# Patient Record
Sex: FEMALE | Race: WHITE | NOT HISPANIC OR LATINO
[De-identification: names, ages, dates, MRNs, and addresses within clinical notes are randomized per-mention and may not be internally consistent; named-entity substitution may affect disease eponyms.]

---

## 2018-06-26 ENCOUNTER — TRANSCRIPTION ENCOUNTER (OUTPATIENT)
Age: 78
End: 2018-06-26

## 2019-02-14 ENCOUNTER — RECORD ABSTRACTING (OUTPATIENT)
Age: 79
End: 2019-02-14

## 2019-02-14 DIAGNOSIS — Z82.49 FAMILY HISTORY OF ISCHEMIC HEART DISEASE AND OTHER DISEASES OF THE CIRCULATORY SYSTEM: ICD-10-CM

## 2019-02-14 DIAGNOSIS — Z78.9 OTHER SPECIFIED HEALTH STATUS: ICD-10-CM

## 2019-02-14 DIAGNOSIS — Z87.891 PERSONAL HISTORY OF NICOTINE DEPENDENCE: ICD-10-CM

## 2019-02-14 DIAGNOSIS — Z84.1 FAMILY HISTORY OF DISORDERS OF KIDNEY AND URETER: ICD-10-CM

## 2019-02-14 DIAGNOSIS — J90 PLEURAL EFFUSION, NOT ELSEWHERE CLASSIFIED: ICD-10-CM

## 2019-02-14 DIAGNOSIS — Z83.3 FAMILY HISTORY OF DIABETES MELLITUS: ICD-10-CM

## 2019-02-15 ENCOUNTER — LABORATORY RESULT (OUTPATIENT)
Age: 79
End: 2019-02-15

## 2019-02-15 ENCOUNTER — APPOINTMENT (OUTPATIENT)
Dept: INTERNAL MEDICINE | Facility: CLINIC | Age: 79
End: 2019-02-15
Payer: MEDICARE

## 2019-02-15 VITALS
HEART RATE: 75 BPM | HEIGHT: 64 IN | DIASTOLIC BLOOD PRESSURE: 78 MMHG | BODY MASS INDEX: 25.61 KG/M2 | SYSTOLIC BLOOD PRESSURE: 120 MMHG | WEIGHT: 150 LBS

## 2019-02-15 DIAGNOSIS — R42 DIZZINESS AND GIDDINESS: ICD-10-CM

## 2019-02-15 DIAGNOSIS — M47.816 SPONDYLOSIS W/OUT MYELOPATHY OR RADICULOPATHY, LUMBAR REGION: ICD-10-CM

## 2019-02-15 PROCEDURE — 36415 COLL VENOUS BLD VENIPUNCTURE: CPT

## 2019-02-15 PROCEDURE — 99397 PER PM REEVAL EST PAT 65+ YR: CPT | Mod: 25

## 2019-02-15 PROCEDURE — G0444 DEPRESSION SCREEN ANNUAL: CPT | Mod: 59

## 2019-02-15 PROCEDURE — 93000 ELECTROCARDIOGRAM COMPLETE: CPT

## 2019-02-15 RX ORDER — HYDROCODONE/ACETAMINOPHEN 10MG-500MG
10-500 TABLET ORAL
Refills: 0 | Status: ACTIVE | COMMUNITY

## 2019-02-15 RX ORDER — POTASSIUM CHLORIDE 750 MG/1
10 CAPSULE, EXTENDED RELEASE ORAL DAILY
Refills: 0 | Status: DISCONTINUED | COMMUNITY
End: 2019-02-15

## 2019-02-15 RX ORDER — ESTROGENS, CONJUGATED 0.62 MG/1
0.62 TABLET, FILM COATED ORAL
Refills: 0 | Status: ACTIVE | COMMUNITY

## 2019-02-15 RX ORDER — FUROSEMIDE 20 MG/1
20 TABLET ORAL DAILY
Refills: 0 | Status: DISCONTINUED | COMMUNITY
End: 2019-02-15

## 2019-02-15 NOTE — HISTORY OF PRESENT ILLNESS
[de-identified] : s/p adm snch pneumonia with parapneum effusion requiring thoracentesis ([facility]) 7/18 then [facility] 8/18 with ppm. Hx low back pain--spinal stenosis--goes [facility]--got epidural

## 2019-02-15 NOTE — PLAN
[FreeTextEntry1] : labs (also as requested by GI)\par ecg\par specialty f/u\par refuses vaccinations

## 2019-02-15 NOTE — HEALTH RISK ASSESSMENT
[Good] : ~his/her~  mood as  good [No falls in past year] : Patient reported no falls in the past year [0] : 2) Feeling down, depressed, or hopeless: Not at all (0) [Patient reported mammogram was normal] : Patient reported mammogram was normal [Patient reported PAP Smear was normal] : Patient reported PAP Smear was normal [Patient reported colonoscopy was normal] : Patient reported colonoscopy was normal [With Significant Other] : lives with significant other [] :  [] : No [de-identified] : victorino(cardio) [de-identified] : social [DXS2Inaul] : 0 [Change in mental status noted] : No change in mental status noted [Language] : denies difficulty with language [Behavior] : denies difficulty with behavior [Learning/Retaining New Information] : denies difficulty learning/retaining new information [Handling Complex Tasks] : denies difficulty handling complex tasks [Reasoning] : denies difficulty with reasoning [Spatial Ability and Orientation] : denies difficulty with spatial ability and orientation [Reports changes in hearing] : Reports no changes in hearing [Reports changes in vision] : Reports no changes in vision [Reports changes in dental health] : Reports no changes in dental health [Smoke Detector] : smoke detector [Carbon Monoxide Detector] : carbon monoxide detector [Guns at Home] : no guns at home [Seat Belt] :  uses seat belt [Sunscreen] : uses sunscreen [Travel to Developing Areas] : does not  travel to developing areas [TB Exposure] : is not being exposed to tuberculosis [Caregiver Concerns] : does not have caregiver concerns [MammogramDate] : 2017 [PapSmearDate] : 2017 [PapSmearComments] : belinda [ColonoscopyDate] : 2016 [ColonoscopyComments] : duarte barrera [Discussed at today's visit] : Advance Directives Discussed at today's visit

## 2019-02-18 LAB
ALBUMIN SERPL ELPH-MCNC: 4.3 G/DL
ALP BLD-CCNC: 74 U/L
ALT SERPL-CCNC: 25 U/L
ANION GAP SERPL CALC-SCNC: 15 MMOL/L
APPEARANCE: ABNORMAL
AST SERPL-CCNC: 21 U/L
BACTERIA: ABNORMAL
BASOPHILS # BLD AUTO: 0.01 K/UL
BASOPHILS NFR BLD AUTO: 0.1 %
BILIRUB SERPL-MCNC: 0.5 MG/DL
BILIRUBIN URINE: NEGATIVE
BLOOD URINE: NEGATIVE
BUN SERPL-MCNC: 17 MG/DL
CALCIUM SERPL-MCNC: 10.2 MG/DL
CHLORIDE SERPL-SCNC: 101 MMOL/L
CHOLEST SERPL-MCNC: 215 MG/DL
CHOLEST/HDLC SERPL: 2.5 RATIO
CO2 SERPL-SCNC: 26 MMOL/L
COLOR: ABNORMAL
CREAT SERPL-MCNC: 0.67 MG/DL
EOSINOPHIL # BLD AUTO: 0 K/UL
EOSINOPHIL NFR BLD AUTO: 0 %
FERRITIN SERPL-MCNC: 102 NG/ML
FOLATE SERPL-MCNC: >20 NG/ML
GGT SERPL-CCNC: 91 U/L
GLUCOSE QUALITATIVE U: NEGATIVE MG/DL
GLUCOSE SERPL-MCNC: 124 MG/DL
HBA1C MFR BLD HPLC: 5.4 %
HCT VFR BLD CALC: 40.6 %
HDLC SERPL-MCNC: 86 MG/DL
HGB BLD-MCNC: 13 G/DL
HYALINE CASTS: 0 /LPF
IMM GRANULOCYTES NFR BLD AUTO: 0.3 %
IRON SATN MFR SERPL: 46 %
IRON SERPL-MCNC: 162 UG/DL
KETONES URINE: ABNORMAL
LDLC SERPL CALC-MCNC: 107 MG/DL
LEUKOCYTE ESTERASE URINE: NEGATIVE
LYMPHOCYTES # BLD AUTO: 1.1 K/UL
LYMPHOCYTES NFR BLD AUTO: 7.7 %
MAN DIFF?: NORMAL
MCHC RBC-ENTMCNC: 31.7 PG
MCHC RBC-ENTMCNC: 32 GM/DL
MCV RBC AUTO: 99 FL
MICROSCOPIC-UA: NORMAL
MONOCYTES # BLD AUTO: 0.94 K/UL
MONOCYTES NFR BLD AUTO: 6.5 %
NEUTROPHILS # BLD AUTO: 12.28 K/UL
NEUTROPHILS NFR BLD AUTO: 85.4 %
NITRITE URINE: NEGATIVE
PH URINE: 5.5
PLATELET # BLD AUTO: 250 K/UL
POTASSIUM SERPL-SCNC: 4.4 MMOL/L
PROT SERPL-MCNC: 7.4 G/DL
PROTEIN URINE: NEGATIVE MG/DL
RBC # BLD: 4.1 M/UL
RBC # FLD: 13.6 %
RED BLOOD CELLS URINE: 1 /HPF
SODIUM SERPL-SCNC: 142 MMOL/L
SPECIFIC GRAVITY URINE: 1.03
SQUAMOUS EPITHELIAL CELLS: 16 /HPF
TIBC SERPL-MCNC: 353 UG/DL
TRIGL SERPL-MCNC: 109 MG/DL
TSH SERPL-ACNC: 0.24 UIU/ML
UIBC SERPL-MCNC: 191 UG/DL
URINE COMMENTS: NORMAL
UROBILINOGEN URINE: NEGATIVE MG/DL
VIT B12 SERPL-MCNC: 483 PG/ML
WBC # FLD AUTO: 14.37 K/UL
WHITE BLOOD CELLS URINE: 5 /HPF

## 2019-02-21 LAB
CELIAC DISEASE INTERPRETATION: NORMAL
CELIAC GENE PAIRS PRESENT: YES
DQ ALPHA 1: NORMAL
DQ BETA 1: NORMAL
IMMUNOGLOBULIN A (IGA): 320 MG/DL

## 2019-05-22 ENCOUNTER — APPOINTMENT (OUTPATIENT)
Dept: ORTHOPEDIC SURGERY | Facility: CLINIC | Age: 79
End: 2019-05-22
Payer: MEDICARE

## 2019-05-22 VITALS
WEIGHT: 150 LBS | SYSTOLIC BLOOD PRESSURE: 130 MMHG | DIASTOLIC BLOOD PRESSURE: 79 MMHG | HEIGHT: 64 IN | BODY MASS INDEX: 25.61 KG/M2 | HEART RATE: 81 BPM

## 2019-05-22 PROCEDURE — 72100 X-RAY EXAM L-S SPINE 2/3 VWS: CPT

## 2019-05-22 PROCEDURE — 99204 OFFICE O/P NEW MOD 45 MIN: CPT

## 2019-05-22 NOTE — CONSULT LETTER
[Dear  ___] : Dear  [unfilled], [Consult Letter:] : I had the pleasure of evaluating your patient, [unfilled]. [Sincerely,] : Sincerely, [FreeTextEntry2] : Dr. Augustine Goel \par [phone] [FreeTextEntry3] : Ham Boateng MD

## 2019-05-22 NOTE — DISCUSSION/SUMMARY
[Surgical risks reviewed] : Surgical risks reviewed [de-identified] : Lumbar spondylolisthesis.\par Severe spinal stenosis.\par We discussed options.\par Several conservative treatment including injections, medications and therapy.\par She like to undergo surgery.\par This would involve an L4-5 decompression and fusion with instrumentation.\par Surgery will entail a lumbar decompressive procedure and fusion with possible instrumentation, local autograft bone, and demineralized bone matrix. Spinal cord monitoring will be used.\par Risks of surgery include infection, dural tear, nerve root injury, reherniation, future back pain, future leg pain, retained fragment, hematoma, urinary retention, worsening leg symptoms, footdrop, inability to place hardware, hardware breakage, nonunion, adjacent level breakdown requiring surgery, anesthetic risks, blood transfusion risks, positioning pain, visceral and vascular injury, deep vein thrombosis, pulmonary embolus, and death. Somatosensory evoked potentials and EMG monitoring will be used. Patient will need spinal orthosis pre and post operatively. All risks were explained not exclusive to the ones mentioned alternatives were discussed and all questions were answered the patient agrees and understands the above and is in complete agreement with the plan. \par Wants a date for surgery \par Discussed options for surgery \par All options discussed including rest, medicine, home exercise, acupuncture, Chiropractic care, Physical Therapy, Pain management, and last resort surgery. \par Her  agrees with the plan.\par All questions were answered, all alternatives discussed and the patient is in complete agreement with that plan. Follow-up appointment as instructed. Any issues and the patient will call or come in sooner.

## 2019-05-22 NOTE — PHYSICAL EXAM
[UE/LE] : Sensory: Intact in bilateral upper & lower extremities [ALL] : dorsalis pedis, posterior tibial, femoral, popliteal, and radial 2+ and symmetric bilaterally [de-identified] : A 4/5 strength tibialis anterior with decreased sensation in L5 distribution otherwise, 5 out of 5 motor strength, sensation is intact and symmetrical full range of motion flexion extension and rotation, no palpatory tenderness full range of motion of hips knees shoulders and elbows (all four extremities), no atrophy, negative straight leg raise, no pathological reflexes, no swelling, normal ambulation, no apparent distress skin is intact, no palpable lymph nodes, no upper or lower extremity instability, alert and oriented x3 and normal mood. Normal finger-to nose test.  [de-identified] : AP/lat lumbar: L4-5 spondylolisthesis-Reviewed with the patient and .\par 1/7/19 [facility] MRI lumbar: Severe L4-5 and mild L3-4 central spinal stenosis, Grade 1 anterolisthesis L4- L5

## 2019-05-22 NOTE — HISTORY OF PRESENT ILLNESS
[Worsening] : worsening [de-identified] : Pt c/o low back pain for several years progressively worsening over the pat 1.5 yrs\par Pt denies radiculopathy at this time\par Had LESI since 2003, had 2 this year, this provides relief in pain for 1-2 months, last injection 2 months ago, performed by Dr Reji Sunshine\par Pt unable to stand for long amount of time due to pain\par Pt takes Hydrocodone 10/325 BID\par Pt stopped PT due to exacerbation of her symptoms\par Patient is miserable and worsening with decreased ambulation. She suffers from stenosis with neurogenic claudication and the injections helped temporarily. If her pain returns. She is here with her .\par No fever chills sweats nausea vomiting no bowel or bladder dysfunction, no recent weight loss or gain no night pain. This history is in addition to the intake form that I personally reviewed.

## 2019-07-02 ENCOUNTER — APPOINTMENT (OUTPATIENT)
Dept: INTERNAL MEDICINE | Facility: CLINIC | Age: 79
End: 2019-07-02
Payer: MEDICARE

## 2019-07-02 VITALS
HEIGHT: 62.5 IN | SYSTOLIC BLOOD PRESSURE: 142 MMHG | WEIGHT: 155 LBS | BODY MASS INDEX: 27.81 KG/M2 | RESPIRATION RATE: 16 BRPM | DIASTOLIC BLOOD PRESSURE: 76 MMHG

## 2019-07-02 DIAGNOSIS — R60.0 LOCALIZED EDEMA: ICD-10-CM

## 2019-07-02 DIAGNOSIS — I87.2 VENOUS INSUFFICIENCY (CHRONIC) (PERIPHERAL): ICD-10-CM

## 2019-07-02 PROCEDURE — 99213 OFFICE O/P EST LOW 20 MIN: CPT

## 2019-07-02 NOTE — HISTORY OF PRESENT ILLNESS
[FreeTextEntry1] : edema [de-identified] : notes semi chronic leg edema, worse past wk--for spinal stenosis surgery 7/30--[facility]--to get echo and cardiac w/u preop--had ppm 8/18[facility]

## 2019-07-02 NOTE — PHYSICAL EXAM
[Normal] : soft, non-tender, non-distended, no masses palpated, no HSM and normal bowel sounds [de-identified] : 1+ edema ol lower legs--neg homans

## 2019-07-15 ENCOUNTER — OUTPATIENT (OUTPATIENT)
Dept: OUTPATIENT SERVICES | Facility: HOSPITAL | Age: 79
LOS: 1 days | End: 2019-07-15

## 2019-07-15 VITALS
SYSTOLIC BLOOD PRESSURE: 136 MMHG | OXYGEN SATURATION: 99 % | WEIGHT: 158.07 LBS | HEART RATE: 76 BPM | RESPIRATION RATE: 16 BRPM | TEMPERATURE: 98 F | DIASTOLIC BLOOD PRESSURE: 70 MMHG | HEIGHT: 62 IN

## 2019-07-15 DIAGNOSIS — Z95.0 PRESENCE OF CARDIAC PACEMAKER: Chronic | ICD-10-CM

## 2019-07-15 DIAGNOSIS — M48.07 SPINAL STENOSIS, LUMBOSACRAL REGION: ICD-10-CM

## 2019-07-15 DIAGNOSIS — Z98.890 OTHER SPECIFIED POSTPROCEDURAL STATES: Chronic | ICD-10-CM

## 2019-07-15 LAB
ANION GAP SERPL CALC-SCNC: 15 MMO/L — HIGH (ref 7–14)
BLD GP AB SCN SERPL QL: NEGATIVE — SIGNIFICANT CHANGE UP
BUN SERPL-MCNC: 10 MG/DL — SIGNIFICANT CHANGE UP (ref 7–23)
CALCIUM SERPL-MCNC: 9.8 MG/DL — SIGNIFICANT CHANGE UP (ref 8.4–10.5)
CHLORIDE SERPL-SCNC: 98 MMOL/L — SIGNIFICANT CHANGE UP (ref 98–107)
CO2 SERPL-SCNC: 25 MMOL/L — SIGNIFICANT CHANGE UP (ref 22–31)
CREAT SERPL-MCNC: 0.53 MG/DL — SIGNIFICANT CHANGE UP (ref 0.5–1.3)
GLUCOSE SERPL-MCNC: 75 MG/DL — SIGNIFICANT CHANGE UP (ref 70–99)
HCT VFR BLD CALC: 42.2 % — SIGNIFICANT CHANGE UP (ref 34.5–45)
HGB BLD-MCNC: 13.6 G/DL — SIGNIFICANT CHANGE UP (ref 11.5–15.5)
MCHC RBC-ENTMCNC: 32 PG — SIGNIFICANT CHANGE UP (ref 27–34)
MCHC RBC-ENTMCNC: 32.2 % — SIGNIFICANT CHANGE UP (ref 32–36)
MCV RBC AUTO: 99.3 FL — SIGNIFICANT CHANGE UP (ref 80–100)
NRBC # FLD: 0.02 K/UL — SIGNIFICANT CHANGE UP (ref 0–0)
PLATELET # BLD AUTO: 228 K/UL — SIGNIFICANT CHANGE UP (ref 150–400)
PMV BLD: 10.1 FL — SIGNIFICANT CHANGE UP (ref 7–13)
POTASSIUM SERPL-MCNC: 4.4 MMOL/L — SIGNIFICANT CHANGE UP (ref 3.5–5.3)
POTASSIUM SERPL-SCNC: 4.4 MMOL/L — SIGNIFICANT CHANGE UP (ref 3.5–5.3)
RBC # BLD: 4.25 M/UL — SIGNIFICANT CHANGE UP (ref 3.8–5.2)
RBC # FLD: 12.1 % — SIGNIFICANT CHANGE UP (ref 10.3–14.5)
RH IG SCN BLD-IMP: POSITIVE — SIGNIFICANT CHANGE UP
SODIUM SERPL-SCNC: 138 MMOL/L — SIGNIFICANT CHANGE UP (ref 135–145)
WBC # BLD: 8.48 K/UL — SIGNIFICANT CHANGE UP (ref 3.8–10.5)
WBC # FLD AUTO: 8.48 K/UL — SIGNIFICANT CHANGE UP (ref 3.8–10.5)

## 2019-07-15 RX ORDER — SODIUM CHLORIDE 9 MG/ML
1000 INJECTION, SOLUTION INTRAVENOUS
Refills: 0 | Status: DISCONTINUED | OUTPATIENT
Start: 2019-07-30 | End: 2019-07-31

## 2019-07-15 RX ORDER — SODIUM CHLORIDE 9 MG/ML
3 INJECTION INTRAMUSCULAR; INTRAVENOUS; SUBCUTANEOUS EVERY 8 HOURS
Refills: 0 | Status: DISCONTINUED | OUTPATIENT
Start: 2019-07-30 | End: 2019-08-01

## 2019-07-15 NOTE — H&P PST ADULT - OTHER CARE PROVIDERS
Dr. Tamir Valdivia Cardiologist [phone]  Dr. Mazariegos Heart Surgeon [phone] (pacemaker) Dr. Tamir Valdivia Cardiologist [phone]  Dr. Mazariegos Heart Surgeon [phone] (pacemaker)  Dr Dawson Sunshine [phone]

## 2019-07-15 NOTE — H&P PST ADULT - NEUROLOGICAL SYMPTOMS
vertigo/loss of sensation/difficulty walking/paresthesias/neuropathy from knees down bilaterally, difficulty walking due to spinal stenosis and peripherl neurapathy

## 2019-07-15 NOTE — H&P PST ADULT - NSICDXPROBLEM_GEN_ALL_CORE_FT
PROBLEM DIAGNOSES  Problem: Spondylolisthesis, lumbar region  Assessment and Plan: Patient scheduled for surgery on 7/30.19  Patient provided with verbal and written pre-surgical instructions, patient verbalized understanding with teach back   Patient provided with Pepcid for GI prophylaxis, Pt verbalized understanding   Patient provided with verbal and written instructions on Chlorhexidine wash, Patient verbalized understanding with teach back   OR booking notified of Pacemaker.   Stress test and echo requested   Patient is going for routine Pacemaker evaluation next week,, report requested   Patient is going for medical clearance as per surgeon's request.

## 2019-07-15 NOTE — H&P PST ADULT - NSICDXPASTSURGICALHX_GEN_ALL_CORE_FT
PAST SURGICAL HISTORY:  Cardiac pacemaker 2018    History of hysteroscopy 1988 due to fibroids PAST SURGICAL HISTORY:  Cardiac pacemaker 2018    H/O: hysterectomy Due to fibroids

## 2019-07-15 NOTE — H&P PST ADULT - HISTORY OF PRESENT ILLNESS
Patient presents today with past medical history of .... Presents today for presurgical evaluation for ... on 7/30/19 Patient presents today with past medical history of spinal stenosis and chronic lower back pain  Presents today for presurgical evaluation for L4-L5 Lumbar laminectomy fusion with instrumentation  on 7/30/19

## 2019-07-15 NOTE — H&P PST ADULT - NSANTHOSAYNRD_GEN_A_CORE
No. CHAD screening performed.  STOP BANG Legend: 0-2 = LOW Risk; 3-4 = INTERMEDIATE Risk; 5-8 = HIGH Risk

## 2019-07-15 NOTE — H&P PST ADULT - ASSESSMENT
79 year old female with past medical history of spinal stenosis and chronic debilitating lower back pain.

## 2019-07-16 PROBLEM — Z86.79 PERSONAL HISTORY OF OTHER DISEASES OF THE CIRCULATORY SYSTEM: Chronic | Status: ACTIVE | Noted: 2019-07-15

## 2019-07-16 LAB — SPECIMEN SOURCE: SIGNIFICANT CHANGE UP

## 2019-07-18 ENCOUNTER — APPOINTMENT (OUTPATIENT)
Dept: INTERNAL MEDICINE | Facility: CLINIC | Age: 79
End: 2019-07-18
Payer: MEDICARE

## 2019-07-18 VITALS
SYSTOLIC BLOOD PRESSURE: 130 MMHG | HEIGHT: 62.5 IN | RESPIRATION RATE: 16 BRPM | WEIGHT: 156 LBS | DIASTOLIC BLOOD PRESSURE: 80 MMHG | BODY MASS INDEX: 27.99 KG/M2

## 2019-07-18 LAB — BACTERIA NPH CULT: SIGNIFICANT CHANGE UP

## 2019-07-18 PROCEDURE — 99214 OFFICE O/P EST MOD 30 MIN: CPT

## 2019-07-18 NOTE — PHYSICAL EXAM
[Normal] : soft, non-tender, non-distended, no masses palpated, no HSM and normal bowel sounds [de-identified] : chronic venous changes

## 2019-07-18 NOTE — PLAN
[FreeTextEntry1] : NO MEDICAL CONTRAINDICATIONS TO SURGERY\par CARDIAC CLEARANCE UNDER SEPARATE COVER

## 2019-07-18 NOTE — HISTORY OF PRESENT ILLNESS
[Implantable Device/Pacemaker] : implantable device/pacemaker [No Pertinent Pulmonary History] : no history of asthma, COPD, sleep apnea, or smoking [No Adverse Anesthesia Reaction] : no adverse anesthesia reaction in self or family member [(Patient denies any chest pain, claudication, dyspnea on exertion, orthopnea, palpitations or syncope)] : Patient denies any chest pain, claudication, dyspnea on exertion, orthopnea, palpitations or syncope [Moderate (4-6 METs)] : Moderate (4-6 METs) [Aortic Stenosis] : no aortic stenosis [Atrial Fibrillation] : no atrial fibrillation [Coronary Artery Disease] : no coronary artery disease [Recent Myocardial Infarction] : no recent myocardial infarction [Chronic Anticoagulation] : no chronic anticoagulation [Chronic Kidney Disease] : no chronic kidney disease [Diabetes] : no diabetes [FreeTextEntry1] : Spinal surgery [FreeTextEntry2] : 7/30/19 [FreeTextEntry3] : [facility] [FreeTextEntry4] : recent venous doppler of legs negative [FreeTextEntry7] : Had recent Echo preop and prior ETT neg (cardio is Livan Valdivia)

## 2019-07-29 ENCOUNTER — TRANSCRIPTION ENCOUNTER (OUTPATIENT)
Age: 79
End: 2019-07-29

## 2019-07-30 ENCOUNTER — APPOINTMENT (OUTPATIENT)
Dept: ORTHOPEDIC SURGERY | Facility: HOSPITAL | Age: 79
End: 2019-07-30
Payer: MEDICARE

## 2019-07-30 ENCOUNTER — RESULT REVIEW (OUTPATIENT)
Age: 79
End: 2019-07-30

## 2019-07-30 ENCOUNTER — INPATIENT (INPATIENT)
Facility: HOSPITAL | Age: 79
LOS: 1 days | Discharge: HOME | End: 2019-08-01
Attending: ORTHOPAEDIC SURGERY | Admitting: ORTHOPAEDIC SURGERY
Payer: MEDICARE

## 2019-07-30 VITALS
WEIGHT: 158.07 LBS | SYSTOLIC BLOOD PRESSURE: 153 MMHG | DIASTOLIC BLOOD PRESSURE: 62 MMHG | RESPIRATION RATE: 14 BRPM | HEART RATE: 70 BPM | OXYGEN SATURATION: 98 % | TEMPERATURE: 99 F | HEIGHT: 62 IN

## 2019-07-30 DIAGNOSIS — Z95.0 PRESENCE OF CARDIAC PACEMAKER: Chronic | ICD-10-CM

## 2019-07-30 DIAGNOSIS — Z90.710 ACQUIRED ABSENCE OF BOTH CERVIX AND UTERUS: Chronic | ICD-10-CM

## 2019-07-30 DIAGNOSIS — M48.07 SPINAL STENOSIS, LUMBOSACRAL REGION: ICD-10-CM

## 2019-07-30 DIAGNOSIS — M43.16 SPONDYLOLISTHESIS, LUMBAR REGION: ICD-10-CM

## 2019-07-30 LAB
ANION GAP SERPL CALC-SCNC: 13 MMO/L — SIGNIFICANT CHANGE UP (ref 7–14)
BASOPHILS # BLD AUTO: 0.03 K/UL — SIGNIFICANT CHANGE UP (ref 0–0.2)
BASOPHILS NFR BLD AUTO: 0.2 % — SIGNIFICANT CHANGE UP (ref 0–2)
BUN SERPL-MCNC: 10 MG/DL — SIGNIFICANT CHANGE UP (ref 7–23)
CALCIUM SERPL-MCNC: 8.9 MG/DL — SIGNIFICANT CHANGE UP (ref 8.4–10.5)
CHLORIDE SERPL-SCNC: 103 MMOL/L — SIGNIFICANT CHANGE UP (ref 98–107)
CO2 SERPL-SCNC: 24 MMOL/L — SIGNIFICANT CHANGE UP (ref 22–31)
CREAT SERPL-MCNC: 0.55 MG/DL — SIGNIFICANT CHANGE UP (ref 0.5–1.3)
EOSINOPHIL # BLD AUTO: 0.08 K/UL — SIGNIFICANT CHANGE UP (ref 0–0.5)
EOSINOPHIL NFR BLD AUTO: 0.6 % — SIGNIFICANT CHANGE UP (ref 0–6)
GLUCOSE SERPL-MCNC: 185 MG/DL — HIGH (ref 70–99)
HCT VFR BLD CALC: 35.5 % — SIGNIFICANT CHANGE UP (ref 34.5–45)
HGB BLD-MCNC: 11.8 G/DL — SIGNIFICANT CHANGE UP (ref 11.5–15.5)
IMM GRANULOCYTES NFR BLD AUTO: 0.8 % — SIGNIFICANT CHANGE UP (ref 0–1.5)
LYMPHOCYTES # BLD AUTO: 0.58 K/UL — LOW (ref 1–3.3)
LYMPHOCYTES # BLD AUTO: 4.3 % — LOW (ref 13–44)
MCHC RBC-ENTMCNC: 32.6 PG — SIGNIFICANT CHANGE UP (ref 27–34)
MCHC RBC-ENTMCNC: 33.2 % — SIGNIFICANT CHANGE UP (ref 32–36)
MCV RBC AUTO: 98.1 FL — SIGNIFICANT CHANGE UP (ref 80–100)
MONOCYTES # BLD AUTO: 0.16 K/UL — SIGNIFICANT CHANGE UP (ref 0–0.9)
MONOCYTES NFR BLD AUTO: 1.2 % — LOW (ref 2–14)
NEUTROPHILS # BLD AUTO: 12.68 K/UL — HIGH (ref 1.8–7.4)
NEUTROPHILS NFR BLD AUTO: 92.9 % — HIGH (ref 43–77)
NRBC # FLD: 0 K/UL — SIGNIFICANT CHANGE UP (ref 0–0)
PLATELET # BLD AUTO: 173 K/UL — SIGNIFICANT CHANGE UP (ref 150–400)
PMV BLD: 9.8 FL — SIGNIFICANT CHANGE UP (ref 7–13)
POTASSIUM SERPL-MCNC: 3.8 MMOL/L — SIGNIFICANT CHANGE UP (ref 3.5–5.3)
POTASSIUM SERPL-SCNC: 3.8 MMOL/L — SIGNIFICANT CHANGE UP (ref 3.5–5.3)
RBC # BLD: 3.62 M/UL — LOW (ref 3.8–5.2)
RBC # FLD: 12.1 % — SIGNIFICANT CHANGE UP (ref 10.3–14.5)
RH IG SCN BLD-IMP: POSITIVE — SIGNIFICANT CHANGE UP
SODIUM SERPL-SCNC: 140 MMOL/L — SIGNIFICANT CHANGE UP (ref 135–145)
WBC # BLD: 13.64 K/UL — HIGH (ref 3.8–10.5)
WBC # FLD AUTO: 13.64 K/UL — HIGH (ref 3.8–10.5)

## 2019-07-30 PROCEDURE — 22840 INSERT SPINE FIXATION DEVICE: CPT | Mod: 82

## 2019-07-30 PROCEDURE — 63267 EXCISE INTRSPINL LESION LMBR: CPT | Mod: 82

## 2019-07-30 PROCEDURE — 22840 INSERT SPINE FIXATION DEVICE: CPT

## 2019-07-30 PROCEDURE — 63048 LAM FACETEC &FORAMOT EA ADDL: CPT | Mod: 82

## 2019-07-30 PROCEDURE — 63047 LAM FACETEC & FORAMOT LUMBAR: CPT | Mod: 82,59

## 2019-07-30 PROCEDURE — 63047 LAM FACETEC & FORAMOT LUMBAR: CPT | Mod: 59

## 2019-07-30 PROCEDURE — 88304 TISSUE EXAM BY PATHOLOGIST: CPT | Mod: 26

## 2019-07-30 PROCEDURE — 22612 ARTHRD PST TQ 1NTRSPC LUMBAR: CPT | Mod: 82

## 2019-07-30 PROCEDURE — 63048 LAM FACETEC &FORAMOT EA ADDL: CPT

## 2019-07-30 PROCEDURE — 63267 EXCISE INTRSPINL LESION LMBR: CPT

## 2019-07-30 PROCEDURE — 72100 X-RAY EXAM L-S SPINE 2/3 VWS: CPT | Mod: 26

## 2019-07-30 PROCEDURE — 22612 ARTHRD PST TQ 1NTRSPC LUMBAR: CPT

## 2019-07-30 PROCEDURE — 88311 DECALCIFY TISSUE: CPT | Mod: 26

## 2019-07-30 RX ORDER — MAGNESIUM HYDROXIDE 400 MG/1
30 TABLET, CHEWABLE ORAL EVERY 12 HOURS
Refills: 0 | Status: DISCONTINUED | OUTPATIENT
Start: 2019-07-30 | End: 2019-08-01

## 2019-07-30 RX ORDER — OXYCODONE HYDROCHLORIDE 5 MG/1
10 TABLET ORAL EVERY 4 HOURS
Refills: 0 | Status: DISCONTINUED | OUTPATIENT
Start: 2019-07-30 | End: 2019-08-01

## 2019-07-30 RX ORDER — SODIUM CHLORIDE 9 MG/ML
1000 INJECTION INTRAMUSCULAR; INTRAVENOUS; SUBCUTANEOUS
Refills: 0 | Status: DISCONTINUED | OUTPATIENT
Start: 2019-07-30 | End: 2019-08-01

## 2019-07-30 RX ORDER — ESTROGENS, CONJUGATED 0.625 MG
1 TABLET ORAL
Qty: 0 | Refills: 0 | DISCHARGE

## 2019-07-30 RX ORDER — ACETAMINOPHEN 500 MG
975 TABLET ORAL EVERY 8 HOURS
Refills: 0 | Status: DISCONTINUED | OUTPATIENT
Start: 2019-07-30 | End: 2019-08-01

## 2019-07-30 RX ORDER — CEFAZOLIN SODIUM 1 G
2000 VIAL (EA) INJECTION EVERY 8 HOURS
Refills: 0 | Status: COMPLETED | OUTPATIENT
Start: 2019-07-30 | End: 2019-07-31

## 2019-07-30 RX ORDER — HYDROMORPHONE HYDROCHLORIDE 2 MG/ML
0.5 INJECTION INTRAMUSCULAR; INTRAVENOUS; SUBCUTANEOUS EVERY 4 HOURS
Refills: 0 | Status: DISCONTINUED | OUTPATIENT
Start: 2019-07-30 | End: 2019-08-01

## 2019-07-30 RX ORDER — HYDROMORPHONE HYDROCHLORIDE 2 MG/ML
1 INJECTION INTRAMUSCULAR; INTRAVENOUS; SUBCUTANEOUS
Refills: 0 | Status: DISCONTINUED | OUTPATIENT
Start: 2019-07-30 | End: 2019-07-30

## 2019-07-30 RX ORDER — SENNA PLUS 8.6 MG/1
2 TABLET ORAL AT BEDTIME
Refills: 0 | Status: DISCONTINUED | OUTPATIENT
Start: 2019-07-30 | End: 2019-08-01

## 2019-07-30 RX ORDER — ASCORBIC ACID 60 MG
1 TABLET,CHEWABLE ORAL
Qty: 0 | Refills: 0 | DISCHARGE

## 2019-07-30 RX ORDER — OXYCODONE HYDROCHLORIDE 5 MG/1
5 TABLET ORAL EVERY 4 HOURS
Refills: 0 | Status: DISCONTINUED | OUTPATIENT
Start: 2019-07-30 | End: 2019-08-01

## 2019-07-30 RX ORDER — HYDROMORPHONE HYDROCHLORIDE 2 MG/ML
0.5 INJECTION INTRAMUSCULAR; INTRAVENOUS; SUBCUTANEOUS
Refills: 0 | Status: DISCONTINUED | OUTPATIENT
Start: 2019-07-30 | End: 2019-07-30

## 2019-07-30 RX ORDER — ONDANSETRON 8 MG/1
4 TABLET, FILM COATED ORAL EVERY 4 HOURS
Refills: 0 | Status: DISCONTINUED | OUTPATIENT
Start: 2019-07-30 | End: 2019-08-01

## 2019-07-30 RX ORDER — MECLIZINE HCL 12.5 MG
25 TABLET ORAL THREE TIMES A DAY
Refills: 0 | Status: DISCONTINUED | OUTPATIENT
Start: 2019-07-30 | End: 2019-08-01

## 2019-07-30 RX ORDER — CYCLOBENZAPRINE HYDROCHLORIDE 10 MG/1
5 TABLET, FILM COATED ORAL THREE TIMES A DAY
Refills: 0 | Status: DISCONTINUED | OUTPATIENT
Start: 2019-07-30 | End: 2019-08-01

## 2019-07-30 RX ORDER — ONDANSETRON 8 MG/1
4 TABLET, FILM COATED ORAL ONCE
Refills: 0 | Status: DISCONTINUED | OUTPATIENT
Start: 2019-07-30 | End: 2019-07-30

## 2019-07-30 RX ORDER — DOCUSATE SODIUM 100 MG
100 CAPSULE ORAL THREE TIMES A DAY
Refills: 0 | Status: DISCONTINUED | OUTPATIENT
Start: 2019-07-30 | End: 2019-08-01

## 2019-07-30 RX ADMIN — OXYCODONE HYDROCHLORIDE 10 MILLIGRAM(S): 5 TABLET ORAL at 22:50

## 2019-07-30 RX ADMIN — HYDROMORPHONE HYDROCHLORIDE 0.5 MILLIGRAM(S): 2 INJECTION INTRAMUSCULAR; INTRAVENOUS; SUBCUTANEOUS at 17:00

## 2019-07-30 RX ADMIN — HYDROMORPHONE HYDROCHLORIDE 1 MILLIGRAM(S): 2 INJECTION INTRAMUSCULAR; INTRAVENOUS; SUBCUTANEOUS at 16:35

## 2019-07-30 RX ADMIN — Medication 100 MILLIGRAM(S): at 21:29

## 2019-07-30 RX ADMIN — OXYCODONE HYDROCHLORIDE 10 MILLIGRAM(S): 5 TABLET ORAL at 19:20

## 2019-07-30 RX ADMIN — HYDROMORPHONE HYDROCHLORIDE 1 MILLIGRAM(S): 2 INJECTION INTRAMUSCULAR; INTRAVENOUS; SUBCUTANEOUS at 16:50

## 2019-07-30 RX ADMIN — SENNA PLUS 2 TABLET(S): 8.6 TABLET ORAL at 21:29

## 2019-07-30 RX ADMIN — Medication 975 MILLIGRAM(S): at 21:28

## 2019-07-30 RX ADMIN — SODIUM CHLORIDE 30 MILLILITER(S): 9 INJECTION, SOLUTION INTRAVENOUS at 16:25

## 2019-07-30 RX ADMIN — HYDROMORPHONE HYDROCHLORIDE 0.5 MILLIGRAM(S): 2 INJECTION INTRAMUSCULAR; INTRAVENOUS; SUBCUTANEOUS at 17:55

## 2019-07-30 RX ADMIN — HYDROMORPHONE HYDROCHLORIDE 0.5 MILLIGRAM(S): 2 INJECTION INTRAMUSCULAR; INTRAVENOUS; SUBCUTANEOUS at 17:15

## 2019-07-30 RX ADMIN — SODIUM CHLORIDE 125 MILLILITER(S): 9 INJECTION INTRAMUSCULAR; INTRAVENOUS; SUBCUTANEOUS at 16:35

## 2019-07-30 RX ADMIN — HYDROMORPHONE HYDROCHLORIDE 0.5 MILLIGRAM(S): 2 INJECTION INTRAMUSCULAR; INTRAVENOUS; SUBCUTANEOUS at 17:40

## 2019-07-30 RX ADMIN — OXYCODONE HYDROCHLORIDE 10 MILLIGRAM(S): 5 TABLET ORAL at 18:50

## 2019-07-30 RX ADMIN — SODIUM CHLORIDE 3 MILLILITER(S): 9 INJECTION INTRAMUSCULAR; INTRAVENOUS; SUBCUTANEOUS at 22:09

## 2019-07-30 RX ADMIN — OXYCODONE HYDROCHLORIDE 10 MILLIGRAM(S): 5 TABLET ORAL at 23:30

## 2019-07-30 NOTE — PROGRESS NOTE ADULT - SUBJECTIVE AND OBJECTIVE BOX
ORTHO POST OP NOTE    Pt resting comfortably without complaint. No chest pain/no dizziness/no SOB/HA/N/V/D.  Pain controlled at this time. Patient reports incisional pain in the lumbar spine.       Vital Signs Last 24 Hrs  T(C): 36.5 (30 Jul 2019 18:00), Max: 37 (30 Jul 2019 09:28)  T(F): 97.7 (30 Jul 2019 18:00), Max: 98.6 (30 Jul 2019 09:28)  HR: 75 (30 Jul 2019 19:00) (64 - 87)  BP: 121/47 (30 Jul 2019 18:45) (117/44 - 153/62)  BP(mean): 64 (30 Jul 2019 18:45) (61 - 79)  RR: 15 (30 Jul 2019 19:00) (12 - 22)  SpO2: 99% (30 Jul 2019 19:00) (92% - 99%)                        11.8   13.64 )-----------( 173      ( 30 Jul 2019 18:00 )             35.5     07-30    140  |  103  |  10  ----------------------------<  185<H>  3.8   |  24  |  0.55    Ca    8.9      30 Jul 2019 18:00        Spine: Dressing/ Incision Clean/Dry/Intact  HV in place  Bilat LE exam:  EHL/FHL/GC/TA 5/5                     Sensation grossly intact to light touch distal                     DP pulse 2+      A/P: 79y Female s/p L4-5 lumbar Laminectomy/PSF, POD #0  - WBAT  -DVT ppx- venodynes + ambulation  - Pain control  -Incentive spirometer  -FU AM labs  -PT/OT  -Continue to monitor. Notify ortho with any questions. ORTHO POST OP NOTE    Pt resting comfortably without complaint. No chest pain/no dizziness/no SOB/HA/N/V/D.  Pain controlled at this time. Patient reports incisional pain in the lumbar spine.       Vital Signs Last 24 Hrs  T(C): 36.5 (30 Jul 2019 18:00), Max: 37 (30 Jul 2019 09:28)  T(F): 97.7 (30 Jul 2019 18:00), Max: 98.6 (30 Jul 2019 09:28)  HR: 75 (30 Jul 2019 19:00) (64 - 87)  BP: 121/47 (30 Jul 2019 18:45) (117/44 - 153/62)  BP(mean): 64 (30 Jul 2019 18:45) (61 - 79)  RR: 15 (30 Jul 2019 19:00) (12 - 22)  SpO2: 99% (30 Jul 2019 19:00) (92% - 99%)                        11.8   13.64 )-----------( 173      ( 30 Jul 2019 18:00 )             35.5     07-30    140  |  103  |  10  ----------------------------<  185<H>  3.8   |  24  |  0.55    Ca    8.9      30 Jul 2019 18:00        Spine: Dressing/ Incision Clean/Dry/Intact  HV in place  Bilat LE exam:  EHL/FHL/GC/TA 5/5                     Sensation grossly intact to light touch distal                     DP pulse 2+      A/P: 79y Female s/p L4-5 lumbar Laminectomy/PSF, POD #0  - WBAT  -DVT ppx- venodynes + ambulation  - Pain control  -Incentive spirometer  -FU AM labs  -PT/OT  -Continue to monitor. Notify ortho with any questions.    Patient seen post-operatively doing well. Resolved pre-operative leg symptoms, incisional pain controlled. I discussed and agree with the above, Dr. Ham Boateng.

## 2019-07-30 NOTE — BRIEF OPERATIVE NOTE - NSICDXBRIEFPROCEDURE_GEN_ALL_CORE_FT
PROCEDURES:  Fusion of posterior lumbar spine 30-Jul-2019 16:10:28 L4/5 [facility] [place]  Laminectomy for posterior lumbar decompression 30-Jul-2019 16:10:15 L4/5 [facility] [place]

## 2019-07-31 ENCOUNTER — TRANSCRIPTION ENCOUNTER (OUTPATIENT)
Age: 79
End: 2019-07-31

## 2019-07-31 DIAGNOSIS — D50.0 IRON DEFICIENCY ANEMIA SECONDARY TO BLOOD LOSS (CHRONIC): ICD-10-CM

## 2019-07-31 DIAGNOSIS — M48.00 SPINAL STENOSIS, SITE UNSPECIFIED: ICD-10-CM

## 2019-07-31 DIAGNOSIS — D72.829 ELEVATED WHITE BLOOD CELL COUNT, UNSPECIFIED: ICD-10-CM

## 2019-07-31 LAB
ANION GAP SERPL CALC-SCNC: 11 MMO/L — SIGNIFICANT CHANGE UP (ref 7–14)
BASOPHILS # BLD AUTO: 0.02 K/UL — SIGNIFICANT CHANGE UP (ref 0–0.2)
BASOPHILS NFR BLD AUTO: 0.1 % — SIGNIFICANT CHANGE UP (ref 0–2)
BUN SERPL-MCNC: 8 MG/DL — SIGNIFICANT CHANGE UP (ref 7–23)
CALCIUM SERPL-MCNC: 8.1 MG/DL — LOW (ref 8.4–10.5)
CHLORIDE SERPL-SCNC: 106 MMOL/L — SIGNIFICANT CHANGE UP (ref 98–107)
CO2 SERPL-SCNC: 24 MMOL/L — SIGNIFICANT CHANGE UP (ref 22–31)
CREAT SERPL-MCNC: 0.57 MG/DL — SIGNIFICANT CHANGE UP (ref 0.5–1.3)
EOSINOPHIL # BLD AUTO: 0.01 K/UL — SIGNIFICANT CHANGE UP (ref 0–0.5)
EOSINOPHIL NFR BLD AUTO: 0.1 % — SIGNIFICANT CHANGE UP (ref 0–6)
GLUCOSE SERPL-MCNC: 156 MG/DL — HIGH (ref 70–99)
HCT VFR BLD CALC: 32.5 % — LOW (ref 34.5–45)
HGB BLD-MCNC: 10.5 G/DL — LOW (ref 11.5–15.5)
IMM GRANULOCYTES NFR BLD AUTO: 0.6 % — SIGNIFICANT CHANGE UP (ref 0–1.5)
LYMPHOCYTES # BLD AUTO: 0.56 K/UL — LOW (ref 1–3.3)
LYMPHOCYTES # BLD AUTO: 3.8 % — LOW (ref 13–44)
MCHC RBC-ENTMCNC: 32 PG — SIGNIFICANT CHANGE UP (ref 27–34)
MCHC RBC-ENTMCNC: 32.3 % — SIGNIFICANT CHANGE UP (ref 32–36)
MCV RBC AUTO: 99.1 FL — SIGNIFICANT CHANGE UP (ref 80–100)
MONOCYTES # BLD AUTO: 0.61 K/UL — SIGNIFICANT CHANGE UP (ref 0–0.9)
MONOCYTES NFR BLD AUTO: 4.1 % — SIGNIFICANT CHANGE UP (ref 2–14)
NEUTROPHILS # BLD AUTO: 13.53 K/UL — HIGH (ref 1.8–7.4)
NEUTROPHILS NFR BLD AUTO: 91.3 % — HIGH (ref 43–77)
NRBC # FLD: 0.02 K/UL — SIGNIFICANT CHANGE UP (ref 0–0)
PLATELET # BLD AUTO: 164 K/UL — SIGNIFICANT CHANGE UP (ref 150–400)
PMV BLD: 10.1 FL — SIGNIFICANT CHANGE UP (ref 7–13)
POTASSIUM SERPL-MCNC: 4.3 MMOL/L — SIGNIFICANT CHANGE UP (ref 3.5–5.3)
POTASSIUM SERPL-SCNC: 4.3 MMOL/L — SIGNIFICANT CHANGE UP (ref 3.5–5.3)
RBC # BLD: 3.28 M/UL — LOW (ref 3.8–5.2)
RBC # FLD: 12 % — SIGNIFICANT CHANGE UP (ref 10.3–14.5)
SODIUM SERPL-SCNC: 141 MMOL/L — SIGNIFICANT CHANGE UP (ref 135–145)
WBC # BLD: 14.82 K/UL — HIGH (ref 3.8–10.5)
WBC # FLD AUTO: 14.82 K/UL — HIGH (ref 3.8–10.5)

## 2019-07-31 PROCEDURE — 99223 1ST HOSP IP/OBS HIGH 75: CPT

## 2019-07-31 RX ORDER — POLYETHYLENE GLYCOL 3350 17 G/17G
17 POWDER, FOR SOLUTION ORAL DAILY
Refills: 0 | Status: DISCONTINUED | OUTPATIENT
Start: 2019-07-31 | End: 2019-08-01

## 2019-07-31 RX ORDER — TRAMADOL HYDROCHLORIDE 50 MG/1
25 TABLET ORAL THREE TIMES A DAY
Refills: 0 | Status: DISCONTINUED | OUTPATIENT
Start: 2019-07-31 | End: 2019-08-01

## 2019-07-31 RX ORDER — SODIUM CHLORIDE 9 MG/ML
1000 INJECTION INTRAMUSCULAR; INTRAVENOUS; SUBCUTANEOUS ONCE
Refills: 0 | Status: COMPLETED | OUTPATIENT
Start: 2019-07-31 | End: 2019-07-31

## 2019-07-31 RX ORDER — ASCORBIC ACID 60 MG
500 TABLET,CHEWABLE ORAL DAILY
Refills: 0 | Status: DISCONTINUED | OUTPATIENT
Start: 2019-07-31 | End: 2019-08-01

## 2019-07-31 RX ORDER — SODIUM CHLORIDE 9 MG/ML
100 INJECTION INTRAMUSCULAR; INTRAVENOUS; SUBCUTANEOUS ONCE
Refills: 0 | Status: DISCONTINUED | OUTPATIENT
Start: 2019-07-31 | End: 2019-07-31

## 2019-07-31 RX ORDER — PANTOPRAZOLE SODIUM 20 MG/1
40 TABLET, DELAYED RELEASE ORAL DAILY
Refills: 0 | Status: DISCONTINUED | OUTPATIENT
Start: 2019-07-31 | End: 2019-08-01

## 2019-07-31 RX ORDER — OXYCODONE HYDROCHLORIDE 5 MG/1
2.5 TABLET ORAL EVERY 4 HOURS
Refills: 0 | Status: DISCONTINUED | OUTPATIENT
Start: 2019-07-31 | End: 2019-08-01

## 2019-07-31 RX ADMIN — Medication 500 MILLIGRAM(S): at 11:25

## 2019-07-31 RX ADMIN — SENNA PLUS 2 TABLET(S): 8.6 TABLET ORAL at 21:36

## 2019-07-31 RX ADMIN — OXYCODONE HYDROCHLORIDE 10 MILLIGRAM(S): 5 TABLET ORAL at 17:54

## 2019-07-31 RX ADMIN — SODIUM CHLORIDE 3 MILLILITER(S): 9 INJECTION INTRAMUSCULAR; INTRAVENOUS; SUBCUTANEOUS at 13:15

## 2019-07-31 RX ADMIN — Medication 975 MILLIGRAM(S): at 06:00

## 2019-07-31 RX ADMIN — Medication 100 MILLIGRAM(S): at 13:27

## 2019-07-31 RX ADMIN — Medication 975 MILLIGRAM(S): at 06:30

## 2019-07-31 RX ADMIN — SODIUM CHLORIDE 3 MILLILITER(S): 9 INJECTION INTRAMUSCULAR; INTRAVENOUS; SUBCUTANEOUS at 05:58

## 2019-07-31 RX ADMIN — Medication 975 MILLIGRAM(S): at 21:36

## 2019-07-31 RX ADMIN — OXYCODONE HYDROCHLORIDE 10 MILLIGRAM(S): 5 TABLET ORAL at 20:33

## 2019-07-31 RX ADMIN — OXYCODONE HYDROCHLORIDE 10 MILLIGRAM(S): 5 TABLET ORAL at 07:40

## 2019-07-31 RX ADMIN — OXYCODONE HYDROCHLORIDE 10 MILLIGRAM(S): 5 TABLET ORAL at 12:15

## 2019-07-31 RX ADMIN — POLYETHYLENE GLYCOL 3350 17 GRAM(S): 17 POWDER, FOR SOLUTION ORAL at 11:24

## 2019-07-31 RX ADMIN — OXYCODONE HYDROCHLORIDE 10 MILLIGRAM(S): 5 TABLET ORAL at 07:08

## 2019-07-31 RX ADMIN — OXYCODONE HYDROCHLORIDE 10 MILLIGRAM(S): 5 TABLET ORAL at 03:08

## 2019-07-31 RX ADMIN — Medication 100 MILLIGRAM(S): at 05:30

## 2019-07-31 RX ADMIN — OXYCODONE HYDROCHLORIDE 10 MILLIGRAM(S): 5 TABLET ORAL at 11:25

## 2019-07-31 RX ADMIN — OXYCODONE HYDROCHLORIDE 10 MILLIGRAM(S): 5 TABLET ORAL at 16:38

## 2019-07-31 RX ADMIN — OXYCODONE HYDROCHLORIDE 10 MILLIGRAM(S): 5 TABLET ORAL at 21:03

## 2019-07-31 RX ADMIN — SODIUM CHLORIDE 1000 MILLILITER(S): 9 INJECTION INTRAMUSCULAR; INTRAVENOUS; SUBCUTANEOUS at 18:03

## 2019-07-31 RX ADMIN — Medication 100 MILLIGRAM(S): at 21:36

## 2019-07-31 RX ADMIN — Medication 975 MILLIGRAM(S): at 13:27

## 2019-07-31 RX ADMIN — Medication 100 MILLIGRAM(S): at 06:04

## 2019-07-31 RX ADMIN — PANTOPRAZOLE SODIUM 40 MILLIGRAM(S): 20 TABLET, DELAYED RELEASE ORAL at 11:24

## 2019-07-31 RX ADMIN — OXYCODONE HYDROCHLORIDE 10 MILLIGRAM(S): 5 TABLET ORAL at 04:00

## 2019-07-31 RX ADMIN — SODIUM CHLORIDE 3 MILLILITER(S): 9 INJECTION INTRAMUSCULAR; INTRAVENOUS; SUBCUTANEOUS at 21:40

## 2019-07-31 NOTE — DISCHARGE NOTE PROVIDER - CARE PROVIDERS DIRECT ADDRESSES
[email or web address]

## 2019-07-31 NOTE — OCCUPATIONAL THERAPY INITIAL EVALUATION ADULT - PERTINENT HX OF CURRENT PROBLEM, REHAB EVAL
Patient presents today with past medical history of spinal stenosis and chronic lower back pain  Presents today for presurgical evaluation for L4-L5 Lumbar laminectomy fusion with instrumentation  on 7/30/19

## 2019-07-31 NOTE — DISCHARGE NOTE PROVIDER - CARE PROVIDER_API CALL
Phil Engel)  Orthopaedic Surgery  [address], [address]  [place] [address]  Phone: [phone]  Fax: [phone]  Follow Up Time: 2 weeks

## 2019-07-31 NOTE — PROGRESS NOTE ADULT - SUBJECTIVE AND OBJECTIVE BOX
Ortho Progress Note    S: Patient seen and examined. No acute events overnight. Pain well controlled with current regimen. Denies lightheadedness/dizziness, CP/SOB. Tolerating diet.       O:  Physical Exam:  Gen: Laying in bed, NAD, alert and oriented.   Resp: Unlabored breathing  Spine PE:  Negative clonus/Babinski    Motor:                 L2             L3             L4               L5            S1  R         5/5           5/5          5/5             5/5           5/5  L          5/5          5/5           5/5             5/5           5/5    Sensory:               L2          L3         L4      L5       S1         (0=absent, 1=impaired, 2=normal, NT=not testable)  R         2            2            2        2        2  L          2            2           2        2         2      Vital Signs Last 24 Hrs  T(C): 37 (31 Jul 2019 06:02), Max: 37 (30 Jul 2019 09:28)  T(F): 98.6 (31 Jul 2019 06:02), Max: 98.6 (30 Jul 2019 09:28)  HR: 85 (31 Jul 2019 06:02) (64 - 87)  BP: 117/44 (31 Jul 2019 06:02) (117/44 - 153/62)  BP(mean): 64 (30 Jul 2019 18:45) (61 - 79)  RR: 18 (31 Jul 2019 06:02) (12 - 22)  SpO2: 96% (31 Jul 2019 06:02) (92% - 99%)                          11.8   13.64 )-----------( 173      ( 30 Jul 2019 18:00 )             35.5       07-30    140  |  103  |  10  ----------------------------<  185<H>  3.8   |  24  |  0.55          I&O's Detail    30 Jul 2019 07:01  -  31 Jul 2019 06:27  --------------------------------------------------------  IN:    IV PiggyBack: 100 mL    Oral Fluid: 200 mL    sodium chloride 0.9%.: 1750 mL  Total IN: 2050 mL    OUT:    Accordian: 360 mL    Indwelling Catheter - Urethral: 965 mL  Total OUT: 1325 mL    Total NET: 725 mL

## 2019-07-31 NOTE — DISCHARGE NOTE PROVIDER - NSDCCPTREATMENT_GEN_ALL_CORE_FT
PRINCIPAL PROCEDURE  Procedure: Laminectomy for posterior lumbar decompression  Findings and Treatment: See dictated operative note

## 2019-07-31 NOTE — CONSULT NOTE ADULT - PROBLEM SELECTOR RECOMMENDATION 2
Likely post-op reactive leukocytosis.  No clinical suspicion for infection.  Continue to monitor CBC.

## 2019-07-31 NOTE — CONSULT NOTE ADULT - PROBLEM SELECTOR RECOMMENDATION 9
s/p L4-5 lami/fusion on 7/30  Pain control with dialudid IV PRN, flexeril PRN  IPC for VTE prophylaxis  surgical site management with ortho  PT eval

## 2019-07-31 NOTE — CONSULT NOTE ADULT - ASSESSMENT
79F with bradycardia s/p PPM, spinal stenosis s/p L4-5 lami/fusion on 7/30 complicated by post-op leukocytosis, anemia.

## 2019-07-31 NOTE — DISCHARGE NOTE PROVIDER - NSDCACTIVITY_GEN_ALL_CORE
Do not make important decisions/Walking - Outdoors allowed/Walking - Indoors allowed/Do not drive or operate machinery/Showering allowed/Stairs allowed/No heavy lifting/straining

## 2019-07-31 NOTE — PROGRESS NOTE ADULT - ASSESSMENT
79F sp L4-5 lami/PSIF    ·	Neuro: Pain control  ·	Resp: IS  ·	GI: Regular diet, bowel reg  ·	MSK: WBAT, PT/OT, monitor HV outpt  ·	Heme: DVT PPX w/ ambulation and venodynes  ·	Dispo: home pending PT    Ortho [id number] 79F sp L4-5 lami/PSIF    ·	Neuro: Pain control  ·	Resp: IS  ·	GI: Regular diet, bowel reg  ·	MSK: WBAT, PT/OT, monitor HV outpt  ·	Heme: DVT PPX w/ ambulation and venodynes  ·	Dispo: home pending PT    Ortho [id number]    Patient seen on rounds today. Doing well resolved pre-operative leg symptoms. Incisional pain controlled. I discussed and agree with the above, Dr. Ham Boateng.

## 2019-07-31 NOTE — CONSULT NOTE ADULT - SUBJECTIVE AND OBJECTIVE BOX
Patient is a 79y old  Female who presents with a chief complaint of L4-5 laminectomy/PSIF (30 Jul 2019 19:06)      HPI:  Patient presents today with past medical history of spinal stenosis and chronic lower back pain  Presents today for presurgical evaluation for L4-L5 Lumbar laminectomy fusion with instrumentation  on 7/30/19 (15 Jul 2019 08:06)  Patient underwent L4-5 lami/fusion on 7/30 complicated by post-op leukocytosis, anemia. Complaining of 5/10 spasm pain at the surgical site, localized, worse with movement, improved on pain meds.  No F/C, N/V, CP, SOB, Cough, lightheadedness, dizziness, abdominal pain, diarrhea, dysuria.    Pain Symptoms if applicable:             	                         none	   mild         moderate         severe  Pain:	           5                 0	    1-3	     4-6	         7-10  Location:	Surgical site  Modifying factors:	Worse with movement  Associated symptoms:	    Allergies    No Known Allergies    Intolerances        HOME MEDICATIONS: Reviewed    MEDICATIONS  (STANDING):  acetaminophen   Tablet .. 975 milliGRAM(s) Oral every 8 hours  ascorbic acid 500 milliGRAM(s) Oral daily  calcium carbonate 1250 mG  + Vitamin D (OsCal 500 + D) 1 Tablet(s) Oral daily  docusate sodium 100 milliGRAM(s) Oral three times a day  pantoprazole    Tablet 40 milliGRAM(s) Oral daily  polyethylene glycol 3350 17 Gram(s) Oral daily  senna 2 Tablet(s) Oral at bedtime  sodium chloride 0.9% lock flush 3 milliLiter(s) IV Push every 8 hours  sodium chloride 0.9%. 1000 milliLiter(s) (125 mL/Hr) IV Continuous <Continuous>  traMADol 25 milliGRAM(s) Oral three times a day    MEDICATIONS  (PRN):  cyclobenzaprine 5 milliGRAM(s) Oral three times a day PRN Muscle Spasm  HYDROmorphone  Injectable 0.5 milliGRAM(s) IV Push every 4 hours PRN breakthrough  magnesium hydroxide Suspension 30 milliLiter(s) Oral every 12 hours PRN Constipation  meclizine 25 milliGRAM(s) Oral three times a day PRN Dizziness  ondansetron Injectable 4 milliGRAM(s) IV Push every 4 hours PRN Nausea and/or Vomiting  oxyCODONE    IR 5 milliGRAM(s) Oral every 4 hours PRN Moderate Pain (4 - 6)  oxyCODONE    IR 10 milliGRAM(s) Oral every 4 hours PRN Severe Pain (7 - 10)  oxyCODONE    IR 2.5 milliGRAM(s) Oral every 4 hours PRN Mild Pain (1 - 3)      PAST MEDICAL & SURGICAL HISTORY:  Spinal stenosis  History of bradycardia: Pacemaker placed in 08/2018  H/O: hysterectomy: Due to fibroids  Cardiac pacemaker: 2018      SOCIAL HISTORY:  No tobacco/alcohol use/abuse.    FAMILY HISTORY:  FH: type 2 diabetes (Mother)      REVIEW OF SYSTEMS:    CONSTITUTIONAL: No fever, weight loss, or fatigue  EYES: No eye pain, visual disturbances, or discharge  ENMT:  No difficulty hearing, tinnitus, vertigo; No sinus or throat pain  NECK: No pain or stiffness  BREASTS: No pain, masses, or nipple discharge  RESPIRATORY: No cough, wheezing, chills or hemoptysis; No shortness of breath  CARDIOVASCULAR: No chest pain, palpitations, dizziness, or leg swelling  GASTROINTESTINAL: No abdominal or epigastric pain. No nausea, vomiting, or hematemesis; No diarrhea or constipation. No melena or hematochezia.  GENITOURINARY: No dysuria, frequency, hematuria, or incontinence  NEUROLOGICAL: No headaches, memory loss, loss of strength, numbness, or tremors  SKIN: No itching, burning, rashes, or lesions   LYMPH NODES: No enlarged glands  ENDOCRINE: No heat or cold intolerance; No hair loss  MUSCULOSKELETAL: Back pain  PSYCHIATRIC: No depression, anxiety, mood swings, or difficulty sleeping  HEME/LYMPH: No easy bruising, or bleeding gums  ALLERGY AND IMMUNOLOGIC: No hives or eczema    Vital Signs Last 24 Hrs  T(C): 36.8 (31 Jul 2019 10:23), Max: 37 (31 Jul 2019 06:02)  T(F): 98.3 (31 Jul 2019 10:23), Max: 98.6 (31 Jul 2019 06:02)  HR: 85 (31 Jul 2019 10:23) (64 - 87)  BP: 111/45 (31 Jul 2019 10:23) (111/45 - 150/49)  BP(mean): 64 (30 Jul 2019 18:45) (61 - 79)  RR: 18 (31 Jul 2019 10:23) (12 - 22)  SpO2: 98% (31 Jul 2019 10:23) (92% - 99%)  CAPILLARY BLOOD GLUCOSE          PHYSICAL EXAM:    GENERAL: NAD, well-groomed, well-developed  HEAD:  Atraumatic, Normocephalic  EYES: EOMI, PERRLA, conjunctiva and sclera clear  ENMT: Moist mucous membranes  NECK: Supple, No JVD  RESPIRATORY: Clear to auscultation bilaterally; No rales, rhonchi, wheezing, or rubs  CARDIOVASCULAR: Regular rate and rhythm; No murmurs, rubs, or gallops  GASTROINTESTINAL: Soft, Nontender, Nondistended; Bowel sounds present  BACK: limited ROM due to pain, drain in place.  GENITOURINARY: Not examined  EXTREMITIES:  2+ Peripheral Pulses, No clubbing, cyanosis, or edema  NERVOUS SYSTEM:  Alert & Oriented X3; Moving all 4 extremities; No gross sensory deficits  PSYCH: Calm  HEME/LYMPH: No lymphadenopathy noted  SKIN: No rashes or lesions; Incisions C/D/I    LABS:                        10.5   14.82 )-----------( 164      ( 31 Jul 2019 06:12 )             32.5     07-31    141  |  106  |  8   ----------------------------<  156<H>  4.3   |  24  |  0.57    Ca    8.1<L>      31 Jul 2019 06:12          CAPILLARY BLOOD GLUCOSE          RADIOLOGY & ADDITIONAL STUDIES:    Imaging:   Personally Reviewed:  [ ] YES               EKG:   Personally Reviewed:  [ ] YES       Care Discussed with Consultant(s)/Other Providers:  Care Discussed with Primary Team.      [x] Increased delirium risk  [x] Delirium and other risks can be reduced by:          -early ambulation          -minimizing "tethers" - IV, oxygen, catheters, etc          -avoiding hypnotics and sedatives          -maintaining hydration/nutrition          -avoid anticholinergics - diphenhydramine, etc          -pain control          -supportive environment

## 2019-07-31 NOTE — DISCHARGE NOTE PROVIDER - HOSPITAL COURSE
Patient is a 80 yo female history of lumbar spinal stenosis s/p L4-5 lumbar laminectomy/posterior spinal fusion with Dr. Boateng on 7/30/2019. Patient tolerated the procedure well without any intraoperative complications. Patient tolerated physical therapy well and pain is controlled. Seen by medical attending for continuity of care and management and cleared for safe discharge. As per surgeon patient stable and ready for discharge.     Please follow up with Dr. Boateng  in 1-2 weeks. Call office to make an appointment. Please follow up with your PMD for continuity of care and management as medications may have changed. Do not take any NSAIDS (motrin, advil, ibuprofren, aleve), Aspirin, Antiinflammatory medications unless instructed by your orthopaedic surgeon to continue. Avoid any heavy lifting, bending, squatting, twisting motion. Keep dressing/incision clean, dry, intact. Any suture/staples to be removed on post op day # 14 at office visit. Weight bear as tolerated.

## 2019-08-01 ENCOUNTER — TRANSCRIPTION ENCOUNTER (OUTPATIENT)
Age: 79
End: 2019-08-01

## 2019-08-01 VITALS
HEART RATE: 72 BPM | OXYGEN SATURATION: 99 % | SYSTOLIC BLOOD PRESSURE: 128 MMHG | RESPIRATION RATE: 17 BRPM | DIASTOLIC BLOOD PRESSURE: 55 MMHG | TEMPERATURE: 98 F

## 2019-08-01 LAB
ANION GAP SERPL CALC-SCNC: 11 MMO/L — SIGNIFICANT CHANGE UP (ref 7–14)
BASOPHILS # BLD AUTO: 0.05 K/UL — SIGNIFICANT CHANGE UP (ref 0–0.2)
BASOPHILS NFR BLD AUTO: 0.4 % — SIGNIFICANT CHANGE UP (ref 0–2)
BUN SERPL-MCNC: 10 MG/DL — SIGNIFICANT CHANGE UP (ref 7–23)
CALCIUM SERPL-MCNC: 8 MG/DL — LOW (ref 8.4–10.5)
CHLORIDE SERPL-SCNC: 108 MMOL/L — HIGH (ref 98–107)
CO2 SERPL-SCNC: 22 MMOL/L — SIGNIFICANT CHANGE UP (ref 22–31)
CREAT SERPL-MCNC: 0.58 MG/DL — SIGNIFICANT CHANGE UP (ref 0.5–1.3)
EOSINOPHIL # BLD AUTO: 0.13 K/UL — SIGNIFICANT CHANGE UP (ref 0–0.5)
EOSINOPHIL NFR BLD AUTO: 0.9 % — SIGNIFICANT CHANGE UP (ref 0–6)
GLUCOSE SERPL-MCNC: 103 MG/DL — HIGH (ref 70–99)
HCT VFR BLD CALC: 29.1 % — LOW (ref 34.5–45)
HGB BLD-MCNC: 9.4 G/DL — LOW (ref 11.5–15.5)
IMM GRANULOCYTES NFR BLD AUTO: 0.6 % — SIGNIFICANT CHANGE UP (ref 0–1.5)
LYMPHOCYTES # BLD AUTO: 24.5 % — SIGNIFICANT CHANGE UP (ref 13–44)
LYMPHOCYTES # BLD AUTO: 3.36 K/UL — HIGH (ref 1–3.3)
MCHC RBC-ENTMCNC: 32.3 % — SIGNIFICANT CHANGE UP (ref 32–36)
MCHC RBC-ENTMCNC: 32.4 PG — SIGNIFICANT CHANGE UP (ref 27–34)
MCV RBC AUTO: 100.3 FL — HIGH (ref 80–100)
MONOCYTES # BLD AUTO: 0.89 K/UL — SIGNIFICANT CHANGE UP (ref 0–0.9)
MONOCYTES NFR BLD AUTO: 6.5 % — SIGNIFICANT CHANGE UP (ref 2–14)
NEUTROPHILS # BLD AUTO: 9.22 K/UL — HIGH (ref 1.8–7.4)
NEUTROPHILS NFR BLD AUTO: 67.1 % — SIGNIFICANT CHANGE UP (ref 43–77)
NRBC # FLD: 0.04 K/UL — SIGNIFICANT CHANGE UP (ref 0–0)
PLATELET # BLD AUTO: 139 K/UL — LOW (ref 150–400)
PMV BLD: 10 FL — SIGNIFICANT CHANGE UP (ref 7–13)
POTASSIUM SERPL-MCNC: 3.7 MMOL/L — SIGNIFICANT CHANGE UP (ref 3.5–5.3)
POTASSIUM SERPL-SCNC: 3.7 MMOL/L — SIGNIFICANT CHANGE UP (ref 3.5–5.3)
RBC # BLD: 2.9 M/UL — LOW (ref 3.8–5.2)
RBC # FLD: 12.5 % — SIGNIFICANT CHANGE UP (ref 10.3–14.5)
SODIUM SERPL-SCNC: 141 MMOL/L — SIGNIFICANT CHANGE UP (ref 135–145)
WBC # BLD: 13.73 K/UL — HIGH (ref 3.8–10.5)
WBC # FLD AUTO: 13.73 K/UL — HIGH (ref 3.8–10.5)

## 2019-08-01 PROCEDURE — 99239 HOSP IP/OBS DSCHRG MGMT >30: CPT

## 2019-08-01 PROCEDURE — 99233 SBSQ HOSP IP/OBS HIGH 50: CPT

## 2019-08-01 RX ORDER — GABAPENTIN 400 MG/1
1 CAPSULE ORAL
Qty: 90 | Refills: 0
Start: 2019-08-01 | End: 2019-08-30

## 2019-08-01 RX ORDER — GABAPENTIN 400 MG/1
1 CAPSULE ORAL
Qty: 42 | Refills: 0
Start: 2019-08-01 | End: 2019-08-14

## 2019-08-01 RX ORDER — EVENING PRIMROSE OIL 500 MG
1 CAPSULE ORAL
Qty: 0 | Refills: 0 | DISCHARGE

## 2019-08-01 RX ORDER — MECLIZINE HCL 12.5 MG
1 TABLET ORAL
Qty: 0 | Refills: 0 | DISCHARGE

## 2019-08-01 RX ORDER — GABAPENTIN 400 MG/1
100 CAPSULE ORAL THREE TIMES A DAY
Refills: 0 | Status: DISCONTINUED | OUTPATIENT
Start: 2019-08-01 | End: 2019-08-01

## 2019-08-01 RX ORDER — SENNA PLUS 8.6 MG/1
2 TABLET ORAL
Qty: 14 | Refills: 0
Start: 2019-08-01 | End: 2019-08-07

## 2019-08-01 RX ORDER — OXYCODONE HYDROCHLORIDE 5 MG/1
1 TABLET ORAL
Qty: 56 | Refills: 0
Start: 2019-08-01 | End: 2019-08-07

## 2019-08-01 RX ORDER — PANTOPRAZOLE SODIUM 20 MG/1
1 TABLET, DELAYED RELEASE ORAL
Qty: 30 | Refills: 0
Start: 2019-08-01 | End: 2019-08-30

## 2019-08-01 RX ORDER — DOCUSATE SODIUM 100 MG
1 CAPSULE ORAL
Qty: 21 | Refills: 0
Start: 2019-08-01 | End: 2019-08-07

## 2019-08-01 RX ORDER — TRAMADOL HYDROCHLORIDE 50 MG/1
0.5 TABLET ORAL
Qty: 10.5 | Refills: 0
Start: 2019-08-01 | End: 2019-08-07

## 2019-08-01 RX ORDER — ACETAMINOPHEN 500 MG
3 TABLET ORAL
Qty: 63 | Refills: 0
Start: 2019-08-01 | End: 2019-08-07

## 2019-08-01 RX ORDER — CYCLOBENZAPRINE HYDROCHLORIDE 10 MG/1
1 TABLET, FILM COATED ORAL
Qty: 21 | Refills: 0
Start: 2019-08-01 | End: 2019-08-07

## 2019-08-01 RX ADMIN — PANTOPRAZOLE SODIUM 40 MILLIGRAM(S): 20 TABLET, DELAYED RELEASE ORAL at 12:44

## 2019-08-01 RX ADMIN — OXYCODONE HYDROCHLORIDE 10 MILLIGRAM(S): 5 TABLET ORAL at 01:02

## 2019-08-01 RX ADMIN — OXYCODONE HYDROCHLORIDE 10 MILLIGRAM(S): 5 TABLET ORAL at 06:07

## 2019-08-01 RX ADMIN — OXYCODONE HYDROCHLORIDE 10 MILLIGRAM(S): 5 TABLET ORAL at 00:32

## 2019-08-01 RX ADMIN — OXYCODONE HYDROCHLORIDE 10 MILLIGRAM(S): 5 TABLET ORAL at 05:37

## 2019-08-01 RX ADMIN — Medication 1 TABLET(S): at 12:44

## 2019-08-01 RX ADMIN — Medication 975 MILLIGRAM(S): at 05:38

## 2019-08-01 RX ADMIN — POLYETHYLENE GLYCOL 3350 17 GRAM(S): 17 POWDER, FOR SOLUTION ORAL at 12:44

## 2019-08-01 RX ADMIN — SODIUM CHLORIDE 3 MILLILITER(S): 9 INJECTION INTRAMUSCULAR; INTRAVENOUS; SUBCUTANEOUS at 05:30

## 2019-08-01 RX ADMIN — Medication 100 MILLIGRAM(S): at 05:37

## 2019-08-01 RX ADMIN — GABAPENTIN 100 MILLIGRAM(S): 400 CAPSULE ORAL at 12:44

## 2019-08-01 RX ADMIN — OXYCODONE HYDROCHLORIDE 10 MILLIGRAM(S): 5 TABLET ORAL at 10:22

## 2019-08-01 RX ADMIN — Medication 500 MILLIGRAM(S): at 12:45

## 2019-08-01 NOTE — DISCHARGE NOTE NURSING/CASE MANAGEMENT/SOCIAL WORK - NSDCPNINST_GEN_ALL_CORE
Maintain back incision and dressing clean dry and intact. Call MD with any signs of infection ie fever, redness, drainage, or with signs of bleeding, unrelieved increased pain, or persistent nausea. Avoid twisting motion and remember to logroll to turn in bed. Continue to drink plenty of fluids. Avoid strenuous activity and constipation which may be a side effect from taking certain medications and narcotics. No heavy lifting greater than 10 pounds, ie. a gallon of ilda. Safety and fall prevention measures reinforced. Follow-up with MD in offfice as instructed.

## 2019-08-01 NOTE — PROGRESS NOTE ADULT - PROBLEM SELECTOR PLAN 1
s/p L4-5 lami/fusion on 7/30  Pain control with dialudid IV PRN, flexeril PRN  IPC for VTE prophylaxis  surgical site management with ortho  dispo to home

## 2019-08-01 NOTE — DISCHARGE NOTE NURSING/CASE MANAGEMENT/SOCIAL WORK - NSDCPEWEB_GEN_ALL_CORE
[place] website --- [email or web address] for Tobacco Control website --- [email or web address]

## 2019-08-01 NOTE — DISCHARGE NOTE NURSING/CASE MANAGEMENT/SOCIAL WORK - NSDCDPATPORTLINK_GEN_ALL_CORE
You can access the [facility] Patient Portal, offered by [facility], by registering with the following website: [email or web address]

## 2019-08-01 NOTE — DISCHARGE NOTE NURSING/CASE MANAGEMENT/SOCIAL WORK - NSDCPEEMAIL_GEN_ALL_CORE
[facility] for Tobacco Control email [email or web address]

## 2019-08-01 NOTE — PROGRESS NOTE ADULT - SUBJECTIVE AND OBJECTIVE BOX
CC:F/U for spinal stenosis    SUBJECTIVE / OVERNIGHT EVENTS:  Offers no new complaints.  No F/C, N/V, CP, SOB, Cough, lightheadedness, dizziness, abdominal pain, diarrhea, dysuria.      MEDICATIONS  (STANDING):  acetaminophen   Tablet .. 975 milliGRAM(s) Oral every 8 hours  ascorbic acid 500 milliGRAM(s) Oral daily  calcium carbonate 1250 mG  + Vitamin D (OsCal 500 + D) 1 Tablet(s) Oral daily  docusate sodium 100 milliGRAM(s) Oral three times a day  gabapentin 100 milliGRAM(s) Oral three times a day  pantoprazole    Tablet 40 milliGRAM(s) Oral daily  polyethylene glycol 3350 17 Gram(s) Oral daily  senna 2 Tablet(s) Oral at bedtime  sodium chloride 0.9% lock flush 3 milliLiter(s) IV Push every 8 hours  sodium chloride 0.9%. 1000 milliLiter(s) (125 mL/Hr) IV Continuous <Continuous>  traMADol 25 milliGRAM(s) Oral three times a day    MEDICATIONS  (PRN):  cyclobenzaprine 5 milliGRAM(s) Oral three times a day PRN Muscle Spasm  HYDROmorphone  Injectable 0.5 milliGRAM(s) IV Push every 4 hours PRN breakthrough  magnesium hydroxide Suspension 30 milliLiter(s) Oral every 12 hours PRN Constipation  meclizine 25 milliGRAM(s) Oral three times a day PRN Dizziness  ondansetron Injectable 4 milliGRAM(s) IV Push every 4 hours PRN Nausea and/or Vomiting  oxyCODONE    IR 5 milliGRAM(s) Oral every 4 hours PRN Moderate Pain (4 - 6)  oxyCODONE    IR 10 milliGRAM(s) Oral every 4 hours PRN Severe Pain (7 - 10)  oxyCODONE    IR 2.5 milliGRAM(s) Oral every 4 hours PRN Mild Pain (1 - 3)      Vital Signs Last 24 Hrs  T(C): 36.7 (01 Aug 2019 10:20), Max: 36.8 (31 Jul 2019 21:41)  T(F): 98.1 (01 Aug 2019 10:20), Max: 98.3 (31 Jul 2019 21:41)  HR: 77 (01 Aug 2019 10:20) (76 - 86)  BP: 121/50 (01 Aug 2019 10:20) (106/43 - 133/62)  BP(mean): --  RR: 17 (01 Aug 2019 10:20) (16 - 17)  SpO2: 99% (01 Aug 2019 10:20) (96% - 100%)  CAPILLARY BLOOD GLUCOSE        I&O's Summary    31 Jul 2019 07:01  -  01 Aug 2019 07:00  --------------------------------------------------------  IN: 0 mL / OUT: 420 mL / NET: -420 mL        PHYSICAL EXAM:  GENERAL: NAD, well-developed  HEAD:  Atraumatic, Normocephalic  EYES: EOMI, PERRLA, conjunctiva and sclera clear  NECK: Supple, No JVD  CHEST/LUNG: Clear to auscultation bilaterally; No wheeze  HEART: Regular rate and rhythm; No murmurs, rubs, or gallops  ABDOMEN: Soft, Nontender, Nondistended; Bowel sounds present  BACK: limited ROM due to pain, drain in place.  EXTREMITIES:  2+ Peripheral Pulses, No clubbing, cyanosis.  PSYCH: Calm  NEUROLOGY: AAOx3, non-focal neurological exam  SKIN: Surgical dressing C/D/I    LABS:                        9.4    13.73 )-----------( 139      ( 01 Aug 2019 06:00 )             29.1     08-01    141  |  108<H>  |  10  ----------------------------<  103<H>  3.7   |  22  |  0.58    Ca    8.0<L>      01 Aug 2019 06:00                RADIOLOGY & ADDITIONAL TESTS:    Imaging Personally Reviewed:    Care Discussed with Consultants/Other Providers:    Care Discussed with Orthopedic PA about:

## 2019-08-01 NOTE — PROGRESS NOTE ADULT - SUBJECTIVE AND OBJECTIVE BOX
Ortho Progress Note  KATLYN FORBES   MRN-[id number]    79yFemale is s/p elective Lumbar Spine laminectomy / posterior spinal fusion POD#2 w/out any c/o.  Resting comfortably, NAD, ANO x 3    Vital Signs Last 24 Hrs  T(C): 36.4 (01 Aug 2019 05:34), Max: 36.8 (31 Jul 2019 10:23)  T(F): 97.6 (01 Aug 2019 05:34), Max: 98.3 (31 Jul 2019 10:23)  HR: 79 (01 Aug 2019 05:34) (76 - 86)  BP: 133/62 (01 Aug 2019 05:34) (106/43 - 133/62)  BP(mean): --  RR: 16 (01 Aug 2019 05:34) (16 - 18)  SpO2: 99% (01 Aug 2019 05:34) (96% - 100%)  No Known Allergies      S/P elective Lumbar Spine laminectomy / posterior spinal fusion  Lumbar wound postop dressing is C/D/I  HV in place 75cc/last 12hr shift, 140cc/last 24hr shift  motor BLE EHL, TA, GS, Quads intact 5/5 against resistance  sensation BLE intact to light touch  patient in bed and moves comfortably to sitted position w/o difficulty                          10.5   14.82 )-----------( 164      ( 31 Jul 2019 06:12 )             32.5     07-31    141  |  106  |  8   ----------------------------<  156<H>  4.3   |  24  |  0.57    Ca    8.1<L>      31 Jul 2019 06:12          A/P Ortho Stable s/p elective Lumbar Spine laminectomy / posterior spinal fusion POD#2  ck Labs this am  monitor HV - will remove when Dr. Kilo adamson pt  continue venodynes for anticoagulation   continue Physical Therapy BID: WBAT, OOB for gait training, strengthening  discharge planning today when HV out and pt is cleared by physical therapy for safe home discharge Ortho Progress Note  KATLYN FORBES   MRN-[id number]    79yFemale is s/p elective Lumbar Spine laminectomy / posterior spinal fusion POD#2 w/out any c/o.  Resting comfortably, NAD, ANO x 3    Vital Signs Last 24 Hrs  T(C): 36.4 (01 Aug 2019 05:34), Max: 36.8 (31 Jul 2019 10:23)  T(F): 97.6 (01 Aug 2019 05:34), Max: 98.3 (31 Jul 2019 10:23)  HR: 79 (01 Aug 2019 05:34) (76 - 86)  BP: 133/62 (01 Aug 2019 05:34) (106/43 - 133/62)  BP(mean): --  RR: 16 (01 Aug 2019 05:34) (16 - 18)  SpO2: 99% (01 Aug 2019 05:34) (96% - 100%)  No Known Allergies      S/P elective Lumbar Spine laminectomy / posterior spinal fusion  Lumbar wound postop dressing is C/D/I  HV in place 75cc/last 12hr shift, 140cc/last 24hr shift  motor BLE EHL, TA, GS, Quads intact 5/5 against resistance  sensation BLE intact to light touch  patient in bed and moves comfortably to sitted position w/o difficulty                          10.5   14.82 )-----------( 164      ( 31 Jul 2019 06:12 )             32.5     07-31    141  |  106  |  8   ----------------------------<  156<H>  4.3   |  24  |  0.57    Ca    8.1<L>      31 Jul 2019 06:12          A/P Ortho Stable s/p elective Lumbar Spine laminectomy / posterior spinal fusion POD#2  ck Labs this am  monitor HV - will remove when Dr. Kilo adamson pt  continue venodynes for anticoagulation   continue Physical Therapy BID: WBAT, OOB for gait training, strengthening  discharge planning today when HV out and pt is cleared by physical therapy for safe home discharge    Patient seen on rounds today doing well. Ambulating well without leg symptoms. I discussed and agree with the above, Dr. Ham Boateng.

## 2019-08-02 ENCOUNTER — INBOUND DOCUMENT (OUTPATIENT)
Age: 79
End: 2019-08-02

## 2019-08-02 LAB — SURGICAL PATHOLOGY STUDY: SIGNIFICANT CHANGE UP

## 2019-08-05 ENCOUNTER — MEDICATION RENEWAL (OUTPATIENT)
Age: 79
End: 2019-08-05

## 2019-08-14 ENCOUNTER — RX RENEWAL (OUTPATIENT)
Age: 79
End: 2019-08-14

## 2019-08-14 ENCOUNTER — APPOINTMENT (OUTPATIENT)
Dept: ORTHOPEDIC SURGERY | Facility: CLINIC | Age: 79
End: 2019-08-14
Payer: MEDICARE

## 2019-08-14 PROCEDURE — 72100 X-RAY EXAM L-S SPINE 2/3 VWS: CPT

## 2019-08-14 PROCEDURE — 99024 POSTOP FOLLOW-UP VISIT: CPT

## 2019-08-14 RX ORDER — OXYCODONE 5 MG/1
5 TABLET ORAL EVERY 6 HOURS
Qty: 16 | Refills: 0 | Status: ACTIVE | COMMUNITY
Start: 2019-08-05 | End: 1900-01-01

## 2019-09-11 ENCOUNTER — APPOINTMENT (OUTPATIENT)
Dept: ORTHOPEDIC SURGERY | Facility: CLINIC | Age: 79
End: 2019-09-11
Payer: MEDICARE

## 2019-09-11 PROCEDURE — 72100 X-RAY EXAM L-S SPINE 2/3 VWS: CPT

## 2019-09-11 PROCEDURE — 99024 POSTOP FOLLOW-UP VISIT: CPT

## 2019-09-11 NOTE — DISCUSSION/SUMMARY
[de-identified] : 5 months status post lumbar decompression and fusion and doing great.\par She continues to improve.\par She'll continue and ambulating more and more each day.\par Followup in 6 weeks.\par X-rays will be obtained upon return.\par All questions were answered, all alternatives discussed and the patient is in complete agreement with that plan. Follow-up appointment as instructed. Any issues and the patient will call or come in sooner. \par Her  agrees with the plan.

## 2019-09-11 NOTE — HISTORY OF PRESENT ILLNESS
[Stable] : stable [de-identified] : 6 week postop lumbar laminectomy and fusion of L4-5. \par She has complete resolution of her preoperative right leg pain. She does get knee pain from the arthritis. Her incisional pain is improving. She has pain when she sits in a car for a long period of time.  She ambulates with a cane for assistance.  She takes Oxycodone for pain occasionally.  She is here with her .\par No fever chills sweats nausea vomiting no bowel or bladder dysfunction, no recent weight loss or gain no night pain. This history is in addition to the intake form that I personally reviewed.

## 2019-09-11 NOTE — PHYSICAL EXAM
[Cane] : ambulates with cane [de-identified] : 5 out of 5 motor strength, sensation is intact and symmetrical full range of motion flexion extension and rotation, no palpatory tenderness full range of motion of hips knees shoulders and elbows (all four extremities), no atrophy, negative straight leg raise, no pathological reflexes, no swelling, normal ambulation, no apparent distress skin is intact, no palpable lymph nodes, no upper or lower extremity instability, alert and oriented x3 and normal mood. Normal finger-to nose test.  [de-identified] : AP/lat lumbar-Reveals a lumbar laminectomy L4-5 adequate reviewed with the patient and .

## 2019-09-12 ENCOUNTER — CHART COPY (OUTPATIENT)
Age: 79
End: 2019-09-12

## 2019-09-12 RX ORDER — TIZANIDINE 4 MG/1
4 TABLET ORAL
Qty: 30 | Refills: 0 | Status: ACTIVE | COMMUNITY
Start: 2019-09-12 | End: 1900-01-01

## 2019-09-26 ENCOUNTER — APPOINTMENT (OUTPATIENT)
Dept: INTERNAL MEDICINE | Facility: CLINIC | Age: 79
End: 2019-09-26
Payer: MEDICARE

## 2019-09-26 VITALS
DIASTOLIC BLOOD PRESSURE: 73 MMHG | SYSTOLIC BLOOD PRESSURE: 131 MMHG | TEMPERATURE: 98 F | RESPIRATION RATE: 16 BRPM | HEART RATE: 77 BPM | WEIGHT: 154.53 LBS | BODY MASS INDEX: 28.44 KG/M2 | OXYGEN SATURATION: 97 % | HEIGHT: 62 IN

## 2019-09-26 PROCEDURE — 36415 COLL VENOUS BLD VENIPUNCTURE: CPT

## 2019-09-26 PROCEDURE — 99213 OFFICE O/P EST LOW 20 MIN: CPT | Mod: 25

## 2019-09-26 RX ORDER — ONDANSETRON 4 MG/1
4 TABLET ORAL
Qty: 20 | Refills: 0 | Status: ACTIVE | COMMUNITY
Start: 2019-09-26 | End: 1900-01-01

## 2019-09-26 NOTE — PHYSICAL EXAM
[No Acute Distress] : no acute distress [Normal] : normal rate, regular rhythm, normal S1 and S2 and no murmur heard [de-identified] : mild upper abd/epigastric discomfort

## 2019-09-26 NOTE — HISTORY OF PRESENT ILLNESS
[FreeTextEntry1] : nausea [de-identified] : post op laminectomy-nausea with vomiting x1--some loss of appetite--2 wks--went to cardio ([facility]) yesterday--poor historian--took acid reducers with  mild relief

## 2019-09-30 LAB
ALBUMIN SERPL ELPH-MCNC: 3.7 G/DL
ALP BLD-CCNC: 87 U/L
ALT SERPL-CCNC: 16 U/L
AMYLASE/CREAT SERPL: 39 U/L
ANION GAP SERPL CALC-SCNC: 11 MMOL/L
AST SERPL-CCNC: 20 U/L
BILIRUB SERPL-MCNC: 0.2 MG/DL
BUN SERPL-MCNC: 13 MG/DL
CALCIUM SERPL-MCNC: 9.2 MG/DL
CHLORIDE SERPL-SCNC: 105 MMOL/L
CO2 SERPL-SCNC: 26 MMOL/L
CREAT SERPL-MCNC: 0.53 MG/DL
GLUCOSE SERPL-MCNC: 114 MG/DL
NT-PROBNP SERPL-MCNC: 226 PG/ML
POTASSIUM SERPL-SCNC: 4.4 MMOL/L
PROT SERPL-MCNC: 6.6 G/DL
SODIUM SERPL-SCNC: 141 MMOL/L

## 2019-10-04 ENCOUNTER — APPOINTMENT (OUTPATIENT)
Dept: INTERNAL MEDICINE | Facility: CLINIC | Age: 79
End: 2019-10-04
Payer: MEDICARE

## 2019-10-04 VITALS
TEMPERATURE: 98.5 F | DIASTOLIC BLOOD PRESSURE: 79 MMHG | SYSTOLIC BLOOD PRESSURE: 152 MMHG | HEIGHT: 62 IN | RESPIRATION RATE: 16 BRPM | WEIGHT: 155.8 LBS | OXYGEN SATURATION: 98 % | HEART RATE: 80 BPM | BODY MASS INDEX: 28.67 KG/M2

## 2019-10-04 DIAGNOSIS — R14.0 ABDOMINAL DISTENSION (GASEOUS): ICD-10-CM

## 2019-10-04 PROCEDURE — 99213 OFFICE O/P EST LOW 20 MIN: CPT

## 2019-10-04 NOTE — HISTORY OF PRESENT ILLNESS
[FreeTextEntry1] : f/u nausea [de-identified] : somewhat better nausea and vomiting--tolerating fluids--labs reviewed with u/s--gallbladder thickening

## 2019-10-04 NOTE — PHYSICAL EXAM
[Normal] : no respiratory distress, lungs were clear to auscultation bilaterally and no accessory muscle use [de-identified] : local upper abd tenderness

## 2019-10-07 NOTE — ADDENDUM
[FreeTextEntry1] :  This note was authored by Janice Cifuentes working as a medical scribe for Dr. Ham Boateng. The note was reviewed, edited, and revised by Dr. Ham Boateng whom is in agreement with the exam findings, imaging findings, and treatment plan. 08/14/2019.

## 2019-10-07 NOTE — DISCUSSION/SUMMARY
[de-identified] : 2 week postop lumbar laminectomy and fusion of L4-5. \par She is doing great. We discussed all of the do's and don'ts.\par LSO prescribed 7/10/19 \par Followup in 4 weeks.\par All options discussed including rest, medicine, home exercise, acupuncture, Chiropractic care, Physical Therapy, Pain management, and last resort surgery.\par All questions were answered, all alternatives discussed and the patient is in complete agreement with that plan. Follow-up appointment as instructed. Any issues and the patient will call or come in sooner. \par

## 2019-10-07 NOTE — HISTORY OF PRESENT ILLNESS
[Improving] : improving [de-identified] : 2 week postop lumbar laminectomy and fusion of L4-5. \par She has complete resolution of her preoperative right leg pain. She does get knee pain from the arthritis. Her incisional pain is improving. She is here with her .\par No fever chills sweats nausea vomiting no bowel or bladder dysfunction, no recent weight loss or gain no night pain. This history is in addition to the intake form that I personally reviewed.

## 2019-10-07 NOTE — PHYSICAL EXAM
[UE/LE] : Sensory: Intact in bilateral upper & lower extremities [ALL] : dorsalis pedis, posterior tibial, femoral, popliteal, and radial 2+ and symmetric bilaterally [Normal] : Oriented to person, place, and time, insight and judgement were intact and the affect was normal [Walker] : ambulates with walker [Poor Appearance] : well-appearing [Acute Distress] : not in acute distress [de-identified] : 5 out of 5 motor strength, sensation is intact and symmetrical full range of motion flexion extension and rotation, no palpatory tenderness full range of motion of hips knees shoulders and elbows (all four extremities), no atrophy, negative straight leg raise, no pathological reflexes, no swelling, normal ambulation, no apparent distress skin is intact, no palpable lymph nodes, no upper or lower extremity instability, alert and oriented x3 and normal mood. Normal finger-to nose test. \par Incision is healing well. No signs of infection.  [de-identified] : AP/lat lumbar 08/14/2019:Reveals L4-5 laminectomy and fusion adequate reviewed with patient and .

## 2019-10-16 ENCOUNTER — APPOINTMENT (OUTPATIENT)
Dept: ORTHOPEDIC SURGERY | Facility: CLINIC | Age: 79
End: 2019-10-16

## 2019-10-23 ENCOUNTER — APPOINTMENT (OUTPATIENT)
Dept: ORTHOPEDIC SURGERY | Facility: CLINIC | Age: 79
End: 2019-10-23
Payer: MEDICARE

## 2019-10-23 DIAGNOSIS — M79.10 MYALGIA, UNSPECIFIED SITE: ICD-10-CM

## 2019-10-23 DIAGNOSIS — M60.9 MYOSITIS, UNSPECIFIED: ICD-10-CM

## 2019-10-23 PROCEDURE — 20553 NJX 1/MLT TRIGGER POINTS 3/>: CPT | Mod: 58

## 2019-10-23 PROCEDURE — 99024 POSTOP FOLLOW-UP VISIT: CPT

## 2019-10-23 PROCEDURE — 72100 X-RAY EXAM L-S SPINE 2/3 VWS: CPT

## 2019-10-23 NOTE — DISCUSSION/SUMMARY
[de-identified] : Almost 3 months status post lumbar decompression and fusion and doing great.\par She continues to improve.\par She'll continue and ambulating more and more each day.\par I offered an injection after all risks were explained including allergic reaction to an infection under sterile conditions 1 mg of Depo-Medrol and 2 cc of 1% lidocaine without epinephrine was injected into the painful site. The patient tolerated the procedure well and received significant relief following the injection.\par TPI left trochanteric bursa. \par Followup in 3 months.\par X-rays will be obtained upon return.\par All questions were answered, all alternatives discussed and the patient is in complete agreement with that plan. Follow-up appointment as instructed. Any issues and the patient will call or come in sooner. \par Her  agrees with the plan.

## 2019-10-23 NOTE — HISTORY OF PRESENT ILLNESS
[Stable] : stable [de-identified] : Almost 3 months postop lumbar laminectomy and fusion of L4-5. \par Recently went shopping and twisted/strained her back. \par Has left-sided discomfort. \par She has complete resolution of her preoperative right leg pain. \par She does get knee pain from the arthritis. \par Her incisional pain is resolved. \par She has pain when she sits in a car for a long period of time.  \par She ambulates without a device.  \par Takes Tylenol ES and occasionally half an Oxy at night when the pain is bad. \par She is here with her .\par No fever chills sweats nausea vomiting no bowel or bladder dysfunction, no recent weight loss or gain no night pain. This history is in addition to the intake form that I personally reviewed.

## 2019-10-23 NOTE — ADDENDUM
[FreeTextEntry1] :  This note was authored by Janice Cifuentes working as a medical scribe for Dr. Ham Boateng. The note was reviewed, edited, and revised by Dr. Ham Boateng whom is in agreement with the exam findings, imaging findings, and treatment plan. Oct 23, 2019

## 2019-10-23 NOTE — PHYSICAL EXAM
[Cane] : ambulates with cane [UE/LE] : Sensory: Intact in bilateral upper & lower extremities [ALL] : dorsalis pedis, posterior tibial, femoral, popliteal, and radial 2+ and symmetric bilaterally [Normal] : Gait: normal [Poor Appearance] : well-appearing [Acute Distress] : not in acute distress [de-identified] : AP/lat lumbar 10/23/2019 -Reveals a lumbar laminectomy L4-5 adequate reviewed with the patient and . [de-identified] : 5 out of 5 motor strength, sensation is intact and symmetrical full range of motion flexion extension and rotation, no palpatory tenderness full range of motion of hips knees shoulders and elbows (all four extremities), no atrophy, negative straight leg raise, no pathological reflexes, no swelling, normal ambulation, no apparent distress skin is intact, no palpable lymph nodes, no upper or lower extremity instability, alert and oriented x3 and normal mood. Normal finger-to nose test. \par Incision is healed. No signs of infection. \par Focal discomfort over the left greater trochanter.

## 2019-11-13 ENCOUNTER — APPOINTMENT (OUTPATIENT)
Dept: ORTHOPEDIC SURGERY | Facility: CLINIC | Age: 79
End: 2019-11-13

## 2019-11-18 ENCOUNTER — FORM ENCOUNTER (OUTPATIENT)
Age: 79
End: 2019-11-18

## 2020-01-08 ENCOUNTER — RX RENEWAL (OUTPATIENT)
Age: 80
End: 2020-01-08

## 2020-01-08 ENCOUNTER — APPOINTMENT (OUTPATIENT)
Dept: ORTHOPEDIC SURGERY | Facility: CLINIC | Age: 80
End: 2020-01-08
Payer: MEDICARE

## 2020-01-08 VITALS
SYSTOLIC BLOOD PRESSURE: 162 MMHG | WEIGHT: 155 LBS | HEIGHT: 62 IN | HEART RATE: 80 BPM | DIASTOLIC BLOOD PRESSURE: 85 MMHG | BODY MASS INDEX: 28.52 KG/M2

## 2020-01-08 DIAGNOSIS — M17.11 UNILATERAL PRIMARY OSTEOARTHRITIS, RIGHT KNEE: ICD-10-CM

## 2020-01-08 PROCEDURE — 99214 OFFICE O/P EST MOD 30 MIN: CPT

## 2020-01-08 PROCEDURE — 72100 X-RAY EXAM L-S SPINE 2/3 VWS: CPT

## 2020-01-08 RX ORDER — DICLOFENAC SODIUM 75 MG/1
75 TABLET, DELAYED RELEASE ORAL
Qty: 180 | Refills: 0 | Status: ACTIVE | COMMUNITY
Start: 2020-01-08 | End: 1900-01-01

## 2020-01-08 NOTE — ADDENDUM
[FreeTextEntry1] : This note was authored by Janice Cifuentes working as a medical scribe for Dr. Ham Boateng. The note was reviewed, edited, and revised by Dr. Ham Boateng whom is in agreement with the exam findings, imaging findings, and treatment plan. Jan 08, 2020

## 2020-01-08 NOTE — HISTORY OF PRESENT ILLNESS
[Stable] : stable [de-identified] : Almost 5 months postop lumbar laminectomy and fusion of L4-5. \denis Has lower back and left-sided discomfort that is persistent postop.\denis Cannot stand for more than 15 mins at a time without pain.\par She has complete resolution of her preoperative right leg pain. \par She has pain when she sits in a car for a long period of time.  \denis Feels OK when laying down. \par She feels unstable while walking and either uses a cane or holds onto her  for support. \denis Takes Tylenol ES and occasionally half an Oxy at night when the pain is bad. \par She is here with her .\denis Has a history of right knee medial compartment and patellofemoral osteoarthritis. \par No fever chills sweats nausea vomiting no bowel or bladder dysfunction, no recent weight loss or gain no night pain. This history is in addition to the intake form that I personally reviewed.

## 2020-01-08 NOTE — DISCUSSION/SUMMARY
[de-identified] : Almost 5 months status post lumbar decompression and fusion and doing great.\par She is having residual low back pain. \par Right knee medial and patellofemoral osteoarthritis. \par We discussed all options. \par Voltaren. \par Referring to PT for the low back and right knee. \par If no better in 1 month, option of a cortisone injection into the right knee may be given. \par Followup in 1 month for reevaluation. \par All questions were answered, all alternatives discussed and the patient is in complete agreement with that plan. Follow-up appointment as instructed. Any issues and the patient will call or come in sooner. \par Her  agrees with the plan.

## 2020-01-08 NOTE — PHYSICAL EXAM
[Cane] : ambulates with cane [UE/LE] : Sensory: Intact in bilateral upper & lower extremities [ALL] : dorsalis pedis, posterior tibial, femoral, popliteal, and radial 2+ and symmetric bilaterally [Normal] : Oriented to person, place, and time, insight and judgement were intact and the affect was normal [Poor Appearance] : well-appearing [Acute Distress] : not in acute distress [de-identified] : 5 out of 5 motor strength, sensation is intact and symmetrical full range of motion flexion extension and rotation, no palpatory tenderness full range of motion of hips knees shoulders and elbows (all four extremities), no atrophy, negative straight leg raise, no pathological reflexes, no swelling, normal ambulation, no apparent distress skin is intact, no palpable lymph nodes, no upper or lower extremity instability, alert and oriented x3 and normal mood. Normal finger-to nose test. \par Lumbar: Incision is healing well. No signs of infection. Sensation is normal to light touch.  [de-identified] : AP/lat lumbar 01/08/2020 -Reveals a fusion and lumbar laminectomy L4-5 adequate reviewed with the patient and .\par \par 3v right knee 03/29/2019 Francisco Javier-Pesiri: Moderate medial and patellofemoral osteoarthrosis. No sclerosis or marginal osteophyte formation.

## 2020-01-24 ENCOUNTER — APPOINTMENT (OUTPATIENT)
Dept: INTERNAL MEDICINE | Facility: CLINIC | Age: 80
End: 2020-01-24
Payer: MEDICARE

## 2020-01-24 VITALS
HEART RATE: 87 BPM | WEIGHT: 155 LBS | OXYGEN SATURATION: 98 % | TEMPERATURE: 98 F | BODY MASS INDEX: 28.52 KG/M2 | HEIGHT: 62 IN

## 2020-01-24 VITALS — DIASTOLIC BLOOD PRESSURE: 68 MMHG | SYSTOLIC BLOOD PRESSURE: 112 MMHG

## 2020-01-24 DIAGNOSIS — G47.00 INSOMNIA, UNSPECIFIED: ICD-10-CM

## 2020-01-24 DIAGNOSIS — Z95.0 PRESENCE OF CARDIAC PACEMAKER: ICD-10-CM

## 2020-01-24 DIAGNOSIS — R25.1 TREMOR, UNSPECIFIED: ICD-10-CM

## 2020-01-24 DIAGNOSIS — R21 RASH AND OTHER NONSPECIFIC SKIN ERUPTION: ICD-10-CM

## 2020-01-24 DIAGNOSIS — E78.5 HYPERLIPIDEMIA, UNSPECIFIED: ICD-10-CM

## 2020-01-24 PROCEDURE — G0009: CPT

## 2020-01-24 PROCEDURE — 99213 OFFICE O/P EST LOW 20 MIN: CPT | Mod: 25

## 2020-01-24 PROCEDURE — G0444 DEPRESSION SCREEN ANNUAL: CPT | Mod: 59

## 2020-01-24 PROCEDURE — 36415 COLL VENOUS BLD VENIPUNCTURE: CPT

## 2020-01-24 PROCEDURE — G0439: CPT

## 2020-01-24 PROCEDURE — 90732 PPSV23 VACC 2 YRS+ SUBQ/IM: CPT

## 2020-01-24 RX ORDER — LORAZEPAM 1 MG/1
1 TABLET ORAL
Qty: 15 | Refills: 0 | Status: ACTIVE | COMMUNITY
Start: 2020-01-24 | End: 1900-01-01

## 2020-01-24 RX ORDER — TRIAMTERENE AND HYDROCHLOROTHIAZIDE 25; 37.5 MG/1; MG/1
37.5-25 TABLET ORAL
Qty: 30 | Refills: 0 | Status: DISCONTINUED | COMMUNITY
Start: 2019-07-02 | End: 2020-01-24

## 2020-01-24 RX ORDER — HYDROCORTISONE VALERATE 2 MG/G
0.2 CREAM TOPICAL TWICE DAILY
Qty: 1 | Refills: 0 | Status: ACTIVE | COMMUNITY
Start: 2020-01-24 | End: 1900-01-01

## 2020-01-24 NOTE — PHYSICAL EXAM
[No Acute Distress] : no acute distress [Well Developed] : well developed [Well Nourished] : well nourished [Normal Sclera/Conjunctiva] : normal sclera/conjunctiva [Well-Appearing] : well-appearing [EOMI] : extraocular movements intact [PERRL] : pupils equal round and reactive to light [Normal Outer Ear/Nose] : the outer ears and nose were normal in appearance [Normal Oropharynx] : the oropharynx was normal [No JVD] : no jugular venous distention [Supple] : supple [No Lymphadenopathy] : no lymphadenopathy [Thyroid Normal, No Nodules] : the thyroid was normal and there were no nodules present [No Respiratory Distress] : no respiratory distress  [No Accessory Muscle Use] : no accessory muscle use [Clear to Auscultation] : lungs were clear to auscultation bilaterally [Regular Rhythm] : with a regular rhythm [Normal S1, S2] : normal S1 and S2 [Normal Rate] : normal rate  [No Murmur] : no murmur heard [No Carotid Bruits] : no carotid bruits [Pedal Pulses Present] : the pedal pulses are present [No Abdominal Bruit] : a ~M bruit was not heard ~T in the abdomen [No Varicosities] : no varicosities [No Edema] : there was no peripheral edema [No Extremity Clubbing/Cyanosis] : no extremity clubbing/cyanosis [Soft] : abdomen soft [No Palpable Aorta] : no palpable aorta [Non Tender] : non-tender [Non-distended] : non-distended [Normal Bowel Sounds] : normal bowel sounds [No HSM] : no HSM [No Masses] : no abdominal mass palpated [No CVA Tenderness] : no CVA  tenderness [Normal Posterior Cervical Nodes] : no posterior cervical lymphadenopathy [Normal Anterior Cervical Nodes] : no anterior cervical lymphadenopathy [No Spinal Tenderness] : no spinal tenderness [No Joint Swelling] : no joint swelling [Grossly Normal Strength/Tone] : grossly normal strength/tone [No Rash] : no rash [Coordination Grossly Intact] : coordination grossly intact [No Focal Deficits] : no focal deficits [Normal Gait] : normal gait [Deep Tendon Reflexes (DTR)] : deep tendon reflexes were 2+ and symmetric [Normal Affect] : the affect was normal [Normal Insight/Judgement] : insight and judgment were intact [de-identified] : small area macular erythema on back ?eczema? [de-identified] : mild intention tremor

## 2020-01-24 NOTE — HEALTH RISK ASSESSMENT
[Yes] : Yes [No falls in past year] : Patient reported no falls in the past year [No Retinopathy] : No retinopathy [Patient reported mammogram was normal] : Patient reported mammogram was normal [With Significant Other] : lives with significant other [Patient reported bone density results were normal] : Patient reported bone density results were normal [] :  [Fully functional (bathing, dressing, toileting, transferring, walking, feeding)] : Fully functional (bathing, dressing, toileting, transferring, walking, feeding) [Fully functional (using the telephone, shopping, preparing meals, housekeeping, doing laundry, using] : Fully functional and needs no help or supervision to perform IADLs (using the telephone, shopping, preparing meals, housekeeping, doing laundry, using transportation, managing medications and managing finances) [Reports normal functional visual acuity (ie: able to read med bottle)] : Reports normal functional visual acuity [Smoke Detector] : smoke detector [Carbon Monoxide Detector] : carbon monoxide detector [Safety elements used in home] : safety elements used in home [Seat Belt] :  uses seat belt [Sunscreen] : uses sunscreen [With Patient/Caregiver] : With Patient/Caregiver [] : No [EyeExamDate] : 6/19 [de-identified] : social [Reports changes in vision] : Reports no changes in vision [Reports changes in hearing] : Reports no changes in hearing [Reports changes in dental health] : Reports no changes in dental health [Guns at Home] : no guns at home [Travel to Developing Areas] : does not  travel to developing areas [TB Exposure] : is not being exposed to tuberculosis [Caregiver Concerns] : does not have caregiver concerns [MammogramDate] : 3/19 [PapSmearDate] : 2/19 [PapSmearComments] : belinda [BoneDensityDate] : 3/19 [AdvancecareDate] : 1/20

## 2020-01-24 NOTE — HISTORY OF PRESENT ILLNESS
[FreeTextEntry1] : cpx [de-identified] : reviewed ortho notes--pt didn’t get HIDA scan but has not had any progressive abd pain and defers the study--seeing cardio for ppm f/u (rico)--rash on back wihich itches--requests prn sedative for sleep--mild intention tremor noted without other neuro symps

## 2020-02-12 ENCOUNTER — APPOINTMENT (OUTPATIENT)
Dept: ORTHOPEDIC SURGERY | Facility: CLINIC | Age: 80
End: 2020-02-12

## 2020-02-26 ENCOUNTER — APPOINTMENT (OUTPATIENT)
Dept: ORTHOPEDIC SURGERY | Facility: CLINIC | Age: 80
End: 2020-02-26

## 2020-04-03 ENCOUNTER — RX RENEWAL (OUTPATIENT)
Age: 80
End: 2020-04-03

## 2020-04-07 ENCOUNTER — RX RENEWAL (OUTPATIENT)
Age: 80
End: 2020-04-07

## 2020-04-07 ENCOUNTER — APPOINTMENT (OUTPATIENT)
Dept: ORTHOPEDIC SURGERY | Facility: CLINIC | Age: 80
End: 2020-04-07
Payer: MEDICARE

## 2020-04-07 PROCEDURE — 99446 NTRPROF PH1/NTRNET/EHR 5-10: CPT

## 2020-04-07 RX ORDER — DICLOFENAC SODIUM 75 MG/1
75 TABLET, DELAYED RELEASE ORAL
Qty: 180 | Refills: 0 | Status: ACTIVE | COMMUNITY
Start: 2020-04-07 | End: 1900-01-01

## 2020-04-07 NOTE — DISCUSSION/SUMMARY
[de-identified] : 7 1/2 months lumbar laminectomy and fusion doing well.\par Bilateral knee pain \par discussed all options\par Voltaren\par discussed injection in knee\par Has appointment end of May.\par All questions were answered, all alternatives discussed and the patient is in complete agreement with that plan. Follow-up appointment as instructed. Any issues and the patient will call or come in sooner.

## 2020-04-07 NOTE — HISTORY OF PRESENT ILLNESS
[Patient] : the patient [Stable] : stable [de-identified] : 7 1/2 months s/p lumbar laminectomy and fusion.\par Bilateral knee pain-left worse after doing exercises.\par No leg pain.\par ambulating well\par No fever chills sweats nausea vomiting no bowel or bladder dysfunction, no recent weight loss or gain no night pain. This history is in addition to the intake form that I personally reviewed.

## 2020-04-07 NOTE — PHYSICAL EXAM
[Normal] : Gait: normal [de-identified] : ambulating well\par adequate heel and toe walk\par Incision healed\par had rash-better now

## 2020-04-20 RX ORDER — MELOXICAM 15 MG/1
15 TABLET ORAL DAILY
Qty: 90 | Refills: 0 | Status: ACTIVE | COMMUNITY
Start: 2020-04-20 | End: 1900-01-01

## 2020-04-20 RX ORDER — TIZANIDINE 2 MG/1
2 TABLET ORAL EVERY 8 HOURS
Qty: 30 | Refills: 0 | Status: ACTIVE | COMMUNITY
Start: 2020-04-20 | End: 1900-01-01

## 2020-05-21 ENCOUNTER — APPOINTMENT (OUTPATIENT)
Dept: ORTHOPEDIC SURGERY | Facility: CLINIC | Age: 80
End: 2020-05-21

## 2020-05-27 ENCOUNTER — APPOINTMENT (OUTPATIENT)
Dept: ORTHOPEDIC SURGERY | Facility: CLINIC | Age: 80
End: 2020-05-27

## 2020-08-13 ENCOUNTER — APPOINTMENT (OUTPATIENT)
Dept: ORTHOPEDIC SURGERY | Facility: CLINIC | Age: 80
End: 2020-08-13
Payer: MEDICARE

## 2020-08-13 DIAGNOSIS — M43.16 SPONDYLOLISTHESIS, LUMBAR REGION: ICD-10-CM

## 2020-08-13 PROCEDURE — 99446 NTRPROF PH1/NTRNET/EHR 5-10: CPT

## 2020-08-13 NOTE — PHYSICAL EXAM
[Normal] : Gait: normal [SLR] : negative straight leg raise [de-identified] : 5 out of 5 motor strength, sensation is intact and symmetrical full range of motion flexion extension and rotation, full range of motion of hips knees shoulders and elbows (all four extremities), no atrophy, negative straight leg raise, no pathological reflexes, no swelling, normal ambulation, no apparent distress skin is intact, no upper or lower extremity instability, alert and oriented x3 and normal mood.

## 2020-08-13 NOTE — HISTORY OF PRESENT ILLNESS
[Home] : at home, [unfilled] , at the time of the visit. [Verbal consent obtained from patient] : the patient, [unfilled] [Medical Office: ([facility])___] : at the medical office located in  [Stable] : stable [de-identified] : Low back pain.\par Using ice.\par Surgery 1 year 2 months.\par Called to discuss options.\par No fever chills sweats nausea vomiting no bowel or bladder dysfunction, no recent weight loss or gain no night pain. This history is in addition to the intake form that I personally reviewed.

## 2020-08-13 NOTE — DISCUSSION/SUMMARY
[de-identified] : 1 year s/p lumbar laminectomy and fusion.\par Discussed all options.\par getting xray lumbar from [facility] [phone] \par [facility].\par Telehealth in 2 weeks.\par All questions were answered, all alternatives discussed and the patient is in complete agreement with that plan. Follow-up appointment as instructed. Any issues and the patient will call or come in sooner.

## 2020-08-27 ENCOUNTER — APPOINTMENT (OUTPATIENT)
Dept: ORTHOPEDIC SURGERY | Facility: CLINIC | Age: 80
End: 2020-08-27
Payer: MEDICARE

## 2020-08-27 DIAGNOSIS — M48.07 SPINAL STENOSIS, LUMBOSACRAL REGION: ICD-10-CM

## 2020-08-27 PROCEDURE — 99214 OFFICE O/P EST MOD 30 MIN: CPT | Mod: 95

## 2020-08-27 RX ORDER — TIZANIDINE HYDROCHLORIDE 4 MG/1
4 CAPSULE ORAL
Qty: 90 | Refills: 1 | Status: ACTIVE | COMMUNITY
Start: 2020-08-27 | End: 1900-01-01

## 2020-08-27 RX ORDER — DICLOFENAC SODIUM 75 MG/1
75 TABLET, DELAYED RELEASE ORAL
Qty: 60 | Refills: 2 | Status: ACTIVE | COMMUNITY
Start: 2020-08-27 | End: 1900-01-01

## 2020-08-27 NOTE — PHYSICAL EXAM
[Normal] : Gait: normal [Cane] : ambulates with cane [SLR] : negative straight leg raise [de-identified] : 5 out of 5 motor strength, sensation is intact and symmetrical full range of motion flexion extension and rotation, full range of motion of hips knees shoulders and elbows (all four extremities), no atrophy, negative straight leg raise, no pathological reflexes, no swelling, normal ambulation, no apparent distress skin is intact, no upper or lower extremity instability, alert and oriented x3 and normal mood. Severe knee pain right worse than left. [de-identified] : Lumbosacral spine. Upright AP, lateral, function and extension lateral 08/21/2020 (Francisco Javier Ruelas) \par \par The bones are somewhat demineralized.\par There is a slight dextroscoliosis with an exaggerated lordotic curve.\par No fracture is seen but there is trace spondylolisthesis of L4 on 5 with a has been a\par posterior fusion and laminectomy. There is stability on flexion and extension. The\par hardware is intact.\par Disc disease is noted at the thoracolumbar junction as well as L1-2. Anterior osteophytes\par are noted.\par The pedicles and sacroiliac joints are normal.\par There is faint vascular calcification.\par IMPRESSION:\par Posterior fusion L4-5 with laminectomy. Hardware intact. Z98.1, Z98.89\par Degenerative changes with anterior osteophytes thoracolumbar junction as well as L1-2.\par M51.35, M51.36\par Trace spondylolisthesis L4 on 5. M43.16

## 2020-08-27 NOTE — HISTORY OF PRESENT ILLNESS
[Home] : at home, [unfilled] , at the time of the visit. [Medical Office: ([facility])___] : at the medical office located in  [Verbal consent obtained from patient] : the patient, [unfilled] [Stable] : stable [de-identified] : 80 year female presents for follow up of lower back pain.\par Is s/p L3-5 laminectomy, removal of extradural mass on the left facet joint and fusion of L4/5 on 7/30/2019.\par Ache in back.\par Gained 10 lbs.\par Presents for XR lumbar spine review. \par No fever chills sweats nausea vomiting no bowel or bladder dysfunction, no recent weight loss or gain no night pain. This history is in addition to the intake form that I personally reviewed.

## 2020-08-27 NOTE — DISCUSSION/SUMMARY
[de-identified] : s/p L3-5 laminectomy, removal of extradural mass on the left facet joint and fusion of L4/5 on 7/30/2019.\par Discussed all options.\par back soreness\par Tizanidine and Diclofenac.\par Patient will send in xray report of knees for [facility].\par All questions were answered, all alternatives discussed and the patient is in complete agreement with that plan. Follow-up appointment as instructed. Any issues and the patient will call or come in sooner.

## 2020-09-09 ENCOUNTER — APPOINTMENT (OUTPATIENT)
Dept: ORTHOPEDIC SURGERY | Facility: CLINIC | Age: 80
End: 2020-09-09
Payer: MEDICARE

## 2020-09-09 DIAGNOSIS — M51.36 OTHER INTERVERTEBRAL DISC DEGENERATION, LUMBAR REGION: ICD-10-CM

## 2020-09-09 PROCEDURE — 99213 OFFICE O/P EST LOW 20 MIN: CPT | Mod: 95

## 2020-11-21 DIAGNOSIS — Z86.39 PERSONAL HISTORY OF OTHER ENDOCRINE, NUTRITIONAL AND METABOLIC DISEASE: ICD-10-CM

## 2021-03-06 ENCOUNTER — APPOINTMENT (OUTPATIENT)
Dept: INTERNAL MEDICINE | Facility: CLINIC | Age: 81
End: 2021-03-06

## 2021-06-20 NOTE — PROGRESS NOTE ADULT - REASON FOR ADMISSION
L4-5 laminectomy/PSIF
elective Lumbar Spine laminectomy / posterior spinal fusion 7/30/2019
Spinal stenosis
No

## 2021-06-25 ENCOUNTER — APPOINTMENT (OUTPATIENT)
Dept: INTERNAL MEDICINE | Facility: CLINIC | Age: 81
End: 2021-06-25

## 2021-07-29 ENCOUNTER — NON-APPOINTMENT (OUTPATIENT)
Age: 81
End: 2021-07-29

## 2021-07-29 ENCOUNTER — APPOINTMENT (OUTPATIENT)
Dept: INTERNAL MEDICINE | Facility: CLINIC | Age: 81
End: 2021-07-29
Payer: MEDICARE

## 2021-07-29 VITALS — SYSTOLIC BLOOD PRESSURE: 122 MMHG | DIASTOLIC BLOOD PRESSURE: 70 MMHG

## 2021-07-29 VITALS
OXYGEN SATURATION: 97 % | BODY MASS INDEX: 28.71 KG/M2 | HEART RATE: 85 BPM | WEIGHT: 156 LBS | TEMPERATURE: 98.4 F | HEIGHT: 62 IN

## 2021-07-29 DIAGNOSIS — Z00.00 ENCOUNTER FOR GENERAL ADULT MEDICAL EXAMINATION W/OUT ABNORMAL FINDINGS: ICD-10-CM

## 2021-07-29 DIAGNOSIS — F41.9 ANXIETY DISORDER, UNSPECIFIED: ICD-10-CM

## 2021-07-29 DIAGNOSIS — M54.5 LOW BACK PAIN: ICD-10-CM

## 2021-07-29 DIAGNOSIS — K76.0 FATTY (CHANGE OF) LIVER, NOT ELSEWHERE CLASSIFIED: ICD-10-CM

## 2021-07-29 DIAGNOSIS — M25.559 PAIN IN UNSPECIFIED HIP: ICD-10-CM

## 2021-07-29 PROCEDURE — G0439: CPT

## 2021-07-29 PROCEDURE — 36415 COLL VENOUS BLD VENIPUNCTURE: CPT

## 2021-07-29 PROCEDURE — 93000 ELECTROCARDIOGRAM COMPLETE: CPT

## 2021-07-29 NOTE — PHYSICAL EXAM
[No Acute Distress] : no acute distress [Well Nourished] : well nourished [Well Developed] : well developed [Well-Appearing] : well-appearing [Normal Sclera/Conjunctiva] : normal sclera/conjunctiva [PERRL] : pupils equal round and reactive to light [EOMI] : extraocular movements intact [Normal Outer Ear/Nose] : the outer ears and nose were normal in appearance [Normal Oropharynx] : the oropharynx was normal [Normal TMs] : both tympanic membranes were normal [No JVD] : no jugular venous distention [No Lymphadenopathy] : no lymphadenopathy [Supple] : supple [Thyroid Normal, No Nodules] : the thyroid was normal and there were no nodules present [No Respiratory Distress] : no respiratory distress  [No Accessory Muscle Use] : no accessory muscle use [Clear to Auscultation] : lungs were clear to auscultation bilaterally [Normal Rate] : normal rate  [Regular Rhythm] : with a regular rhythm [Normal S1, S2] : normal S1 and S2 [No Murmur] : no murmur heard [No Carotid Bruits] : no carotid bruits [No Abdominal Bruit] : a ~M bruit was not heard ~T in the abdomen [No Varicosities] : no varicosities [Pedal Pulses Present] : the pedal pulses are present [No Edema] : there was no peripheral edema [No Palpable Aorta] : no palpable aorta [No Extremity Clubbing/Cyanosis] : no extremity clubbing/cyanosis [Soft] : abdomen soft [Non Tender] : non-tender [Non-distended] : non-distended [No Masses] : no abdominal mass palpated [No HSM] : no HSM [Normal Bowel Sounds] : normal bowel sounds [Normal Posterior Cervical Nodes] : no posterior cervical lymphadenopathy [Normal Anterior Cervical Nodes] : no anterior cervical lymphadenopathy [No CVA Tenderness] : no CVA  tenderness [No Spinal Tenderness] : no spinal tenderness [No Joint Swelling] : no joint swelling [Grossly Normal Strength/Tone] : grossly normal strength/tone [No Rash] : no rash [Coordination Grossly Intact] : coordination grossly intact [No Focal Deficits] : no focal deficits [Normal Gait] : normal gait [Deep Tendon Reflexes (DTR)] : deep tendon reflexes were 2+ and symmetric [Normal Affect] : the affect was normal [Normal Insight/Judgement] : insight and judgment were intact [de-identified] : wont disrobe [de-identified] : local pain left hip

## 2021-07-29 NOTE — HISTORY OF PRESENT ILLNESS
[Spouse] : spouse [FreeTextEntry1] : cpx [de-identified] : 2 wks pain in left hip--prior bursitis with injection tx\par left ear pain without other auditory symps\par takes hydrocodone prn--sees pain management (MIROSLAVA) and premarin (Haselkoralbert)\par sees cardio for ppm check\par request prn anxiolytic

## 2021-07-30 ENCOUNTER — NON-APPOINTMENT (OUTPATIENT)
Age: 81
End: 2021-07-30

## 2021-08-04 RX ORDER — LORAZEPAM 0.5 MG/1
0.5 TABLET ORAL
Qty: 15 | Refills: 0 | Status: ACTIVE | COMMUNITY
Start: 2021-07-29

## 2021-10-14 ENCOUNTER — OUTPATIENT (OUTPATIENT)
Dept: OUTPATIENT SERVICES | Facility: HOSPITAL | Age: 81
LOS: 1 days | Discharge: HOME | End: 2021-10-14
Payer: MEDICARE

## 2021-10-14 VITALS
OXYGEN SATURATION: 96 % | DIASTOLIC BLOOD PRESSURE: 75 MMHG | HEART RATE: 96 BPM | WEIGHT: 141.1 LBS | SYSTOLIC BLOOD PRESSURE: 135 MMHG | TEMPERATURE: 98 F | HEIGHT: 62 IN | RESPIRATION RATE: 16 BRPM

## 2021-10-14 DIAGNOSIS — M17.11 UNILATERAL PRIMARY OSTEOARTHRITIS, RIGHT KNEE: ICD-10-CM

## 2021-10-14 DIAGNOSIS — Z01.818 ENCOUNTER FOR OTHER PREPROCEDURAL EXAMINATION: ICD-10-CM

## 2021-10-14 DIAGNOSIS — Z95.0 PRESENCE OF CARDIAC PACEMAKER: Chronic | ICD-10-CM

## 2021-10-14 DIAGNOSIS — Z90.710 ACQUIRED ABSENCE OF BOTH CERVIX AND UTERUS: Chronic | ICD-10-CM

## 2021-10-14 DIAGNOSIS — I49.5 SICK SINUS SYNDROME: ICD-10-CM

## 2021-10-14 LAB
A1C WITH ESTIMATED AVERAGE GLUCOSE RESULT: 5.4 % — SIGNIFICANT CHANGE UP (ref 4–5.6)
ANION GAP SERPL CALC-SCNC: 6 MMOL/L — SIGNIFICANT CHANGE UP (ref 5–17)
APTT BLD: 30.5 SEC — SIGNIFICANT CHANGE UP (ref 27.5–35.5)
BASOPHILS # BLD AUTO: 0.07 K/UL — SIGNIFICANT CHANGE UP (ref 0–0.2)
BASOPHILS NFR BLD AUTO: 0.6 % — SIGNIFICANT CHANGE UP (ref 0–2)
BUN SERPL-MCNC: 12 MG/DL — SIGNIFICANT CHANGE UP (ref 7–23)
CALCIUM SERPL-MCNC: 8.9 MG/DL — SIGNIFICANT CHANGE UP (ref 8.5–10.1)
CHLORIDE SERPL-SCNC: 106 MMOL/L — SIGNIFICANT CHANGE UP (ref 96–108)
CO2 SERPL-SCNC: 29 MMOL/L — SIGNIFICANT CHANGE UP (ref 22–31)
CREAT SERPL-MCNC: 0.51 MG/DL — SIGNIFICANT CHANGE UP (ref 0.5–1.3)
EOSINOPHIL # BLD AUTO: 0.08 K/UL — SIGNIFICANT CHANGE UP (ref 0–0.5)
EOSINOPHIL NFR BLD AUTO: 0.7 % — SIGNIFICANT CHANGE UP (ref 0–6)
ESTIMATED AVERAGE GLUCOSE: 108 MG/DL — SIGNIFICANT CHANGE UP (ref 68–114)
GLUCOSE SERPL-MCNC: 96 MG/DL — SIGNIFICANT CHANGE UP (ref 70–99)
HCT VFR BLD CALC: 42.5 % — SIGNIFICANT CHANGE UP (ref 34.5–45)
HGB BLD-MCNC: 14.1 G/DL — SIGNIFICANT CHANGE UP (ref 11.5–15.5)
IMM GRANULOCYTES NFR BLD AUTO: 0.5 % — SIGNIFICANT CHANGE UP (ref 0–1.5)
INR BLD: 1.15 RATIO — SIGNIFICANT CHANGE UP (ref 0.88–1.16)
LYMPHOCYTES # BLD AUTO: 1.56 K/UL — SIGNIFICANT CHANGE UP (ref 1–3.3)
LYMPHOCYTES # BLD AUTO: 14.3 % — SIGNIFICANT CHANGE UP (ref 13–44)
MCHC RBC-ENTMCNC: 32 PG — SIGNIFICANT CHANGE UP (ref 27–34)
MCHC RBC-ENTMCNC: 33.2 GM/DL — SIGNIFICANT CHANGE UP (ref 32–36)
MCV RBC AUTO: 96.4 FL — SIGNIFICANT CHANGE UP (ref 80–100)
MONOCYTES # BLD AUTO: 0.53 K/UL — SIGNIFICANT CHANGE UP (ref 0–0.9)
MONOCYTES NFR BLD AUTO: 4.9 % — SIGNIFICANT CHANGE UP (ref 2–14)
MRSA PCR RESULT.: SIGNIFICANT CHANGE UP
NEUTROPHILS # BLD AUTO: 8.62 K/UL — HIGH (ref 1.8–7.4)
NEUTROPHILS NFR BLD AUTO: 79 % — HIGH (ref 43–77)
NRBC # BLD: 0 /100 WBCS — SIGNIFICANT CHANGE UP (ref 0–0)
PLATELET # BLD AUTO: 276 K/UL — SIGNIFICANT CHANGE UP (ref 150–400)
POTASSIUM SERPL-MCNC: 3.5 MMOL/L — SIGNIFICANT CHANGE UP (ref 3.5–5.3)
POTASSIUM SERPL-SCNC: 3.5 MMOL/L — SIGNIFICANT CHANGE UP (ref 3.5–5.3)
PROTHROM AB SERPL-ACNC: 13.3 SEC — SIGNIFICANT CHANGE UP (ref 10.6–13.6)
RBC # BLD: 4.41 M/UL — SIGNIFICANT CHANGE UP (ref 3.8–5.2)
RBC # FLD: 13.2 % — SIGNIFICANT CHANGE UP (ref 10.3–14.5)
S AUREUS DNA NOSE QL NAA+PROBE: SIGNIFICANT CHANGE UP
SODIUM SERPL-SCNC: 141 MMOL/L — SIGNIFICANT CHANGE UP (ref 135–145)
WBC # BLD: 10.91 K/UL — HIGH (ref 3.8–10.5)
WBC # FLD AUTO: 10.91 K/UL — HIGH (ref 3.8–10.5)

## 2021-10-14 PROCEDURE — 93010 ELECTROCARDIOGRAM REPORT: CPT

## 2021-10-14 NOTE — H&P PST ADULT - HISTORY OF PRESENT ILLNESS
82 yo female, pmh- SSS pacemaker  c/o right knee pain secondary to osteoarthritis - scheduled for right knee arthroplasty  denies recent travels in the past 30 days. No fever, SOB, cough, flu like symptoms or body rash- covid screen  covid vaccine completed

## 2021-10-14 NOTE — PHYSICAL THERAPY INITIAL EVALUATION ADULT - MODIFIED CLINICAL TEST OF SENSORY INTEGRATION IN BALANCE TEST
30 second Sit to Stand Test: (reps: 12 adaptation: arm rests); One Leg Stand Test (OLST): Unable to Perform

## 2021-10-14 NOTE — OCCUPATIONAL THERAPY INITIAL EVALUATION ADULT - NS ASR FOLLOW COMMAND OT EVAL
Patient:   AKASH OLVERA            MRN: [id number]            FIN: [id number]              Age:   79 years     Sex:  FEMALE     :  40   Associated Diagnoses:   None   Author:   EDUIN MAN     Subjective                       (Inserted Image. Unable to display)                Interval hx   remains in  contact/Droplet isolation due to COVID 19 infection   Access is limited   Spoke with nurse over the phone   spoke with dialysis nurse during HD as well   Intradialysis BP dropped (MAP < 60)   UF is turned off       Additional Info   Medications (17) Active  Scheduled: (11)  Allopurinol 100 mg tab  100 mg 1 tab, Oral, Daily  ApixaBAN 5 mg tab  5 mg 1 tab, Oral, Q12H  Atorvastatin 10 mg tab  10 mg 1 tab, Oral, Daily  Benzonatate 100 mg cap  100 mg 1 cap, Oral, QID  Gabapentin 300 mg cap  300 mg 1 cap, Oral, Q Mon, Wed & Fri  HydrALAZINE 25 mg tab  25 mg 1 tab, Oral, Q8H  Insulin human lispro 1 unit/0.01 mL inj  1-6 units, Subcutaneous, QID [with meals & HS]  Isosorbide dinitrate 10 mg tab  10 mg 1 tab, Oral, TID  Lidocaine 4% (40 mg/gm) patch  1 patch, TransDermal, Daily  lidocaine topical patch removal`  Remove Patch:, TransDermal, Daily  Lisinopril 5 mg tab  5 mg 1 tab, Oral, Daily  Continuous: (0)  PRN: (6)  Acetaminophen 325 mg tab  650 mg 2 tab, Oral, Q4H  Dextrose (glucose) 40% 15 gm/37.5 gm oral gel UD  15 gm, Oral, As Directed PRN  Dextrose (glucose) 50% 25 gm/50 mL syringe  12.5 gm 25 mL, IV Push, As Directed PRN  Glucagon 1 mg/1 mL emergency kit SDV  1 mg 1 mL, IM, As Directed PRN  Hydrocodone-acetaminophen 5-325 mg tab  1 tab, Oral, Q12H (variable)  Senna-docusate sodium 8.6-50 mg tab  1 tab, Oral, Daily   .       History of Present Illness   NOTES   Past Medical History:  CHF (congestive heart failure)  Chronic pain  DM (diabetes mellitus)  ESRD (end stage renal disease)  Gout  HTN (hypertension)  NIMA on CPAP  Risk factors for obstructive sleep apnea  Past Surgical History:  No  qualifying data available.  Family History:  MOTHER: CA - Cancer  FATHER: CA - Cancer  Social History:Alcohol  Details: Alcohol Abuse in Household: No.  Use: None.  Details: Use: None.  Exercise  Details: Exercise: Never.  Substance Abuse  Details: Substance Abuse in Household: No.  Use: None.  Tobacco  Details: Smoker in Houshold: No.  Smoked/Smokeless Tobacco Last 30 Days: No.  Smoking Tobacco Use: Never smoker.  Smokeless Tobacco Use Never.  Details: Smoked/Smokeless Tobacco Last 30 Days: No.  Smoking Tobacco Use: Never smoker.  Smokeless Tobacco Use Never.  Cultural/Uatsdin Practices  Details: Uatsdin or Cultural Practices: Pentacostal.  Uatsdin or Cultural Practices While in Hospital: Yes.  Allergies (1) Active Reaction  Zithromax Tongue swelling  Home Medications (10) Active  allopurinol oral 100 mg tablet 100 mg = 1 tab, Oral, Daily  apixaBAN oral 5 mg tablet 5 mg = 1 tab, Oral, BID  gabapentin oral 300 mg capsule 300 mg = 1 cap, Oral, Q Mon, Wed & Fri  hydrALAZINE oral 25 mg tablet (Apresoline) 25 mg = 1 tab, Oral, TID  isosorbide dinitrate oral 10 mg tablet 10 mg = 1 tab, Oral, TID  Lantus (insulin glargine) subcutaneous injection 100 unit/mL 8 unit, Subcutaneous, Q Bedtime  Lipitor oral 10 mg tablet 10 mg = 1 tab, Oral, Daily  lisinopril oral 5 mg tablet 5 mg = 1 tab, Oral, Daily  NovoLOG (insulin aspart) 3 unit, Subcutaneous, TID [before meals]  sevelamer carbonate 2.4 g oral powder for reconstitution 2.4 gm = 1 each, Oral, BID  .       Review of Systems   All other systems Unable to obtain.     Physical Examination   VS/Measurements     Vitals between:   31-MAR-2020 15:11:19   TO   01-APR-2020 15:11:19                   LAST RESULT MINIMUM MAXIMUM  Temperature 36.3 36.3 37.6  Heart Rate 74 72 81  Respiratory Rate 16 16 18  NISBP           92 92 145  NIDBP           55 55 80  NIMBP           95 86 98  SpO2                    95 92 97    Intake and Output   I&O 24 hr   I & O between:  31-MAR-2020  15:11 TO 01-APR-2020 15:11  Med Dosing Weight:  106.1  kg   20-MAR-2020  24 Hour Intake:   0.00  ( 0.00 mL/kg )  24 Hour Output:   0.00           24 Hour Urine/Stool Output:   0.0  24 Hour Balance:   0.00           24 Hour Urine Output:   0.00  ( 0.00 mL/kg/hr )                    Stool Count:  1         Unable to perform PE in light of COVID status and shortage of PPE  Reviewed ROS and PE         Review / Management   Laboratory results:    Chemistry  Sodium: 131 mmol/L Low (04/01/20 07:20:00)  Chloride: 95 mmol/L Low (04/01/20 07:20:00)  BUN: 46 mg/dL High (04/01/20 07:20:00)  Glucose Level: 98 mg/dL (04/01/20 07:20:00)  Potassium: 4.3 mmol/L (04/01/20 07:20:00)  Carbon Dioxide (CO2): 33 mmol/L High (04/01/20 07:20:00)  Creatinine: 9.4 mg/dL High (04/01/20 07:20:00)  Calcium: 9 mg/dL (04/01/20 07:20:00)  Phosphorus: 4.3 mg/dL (04/01/20 07:20:00)CBC   No qualifying data available..    Radiology results         Impression and Plan   This is a 78 yo F with ESRD on HD F  [facility] under Dr Maru Wolfe,  DM2, hx of DVT, severe pulmonary hypertension on home O2, CAD on medical treatment, dialstolic CMP, HTN, she was admitted with Acute on Chronic respiratory failure, COVID-19 positive Pneumonia with sepsis  3/23/2020, Influenza A/B negative. This is Nephrology follow up  ESRD on HD MWF had dialysis today   HTN in ESRD: intra-dialysis hypotension is limiting UF, BP dropped with 900 ml UF, will be given total o 500 ml back  HypoNa   Hgb, Ca and phos are acceptable from renal stand point    HD access: Lt arm AVG       Dx and Plan:  Orders   .   100% of the time

## 2021-10-14 NOTE — PHYSICAL THERAPY INITIAL EVALUATION ADULT - ASR EQUIP NEEDS DISCH PT EVAL
Pt currently has a Rolling Walker and Straight Cane in good, working condition./3:1 commode/crutches

## 2021-10-14 NOTE — H&P PST ADULT - PROBLEM SELECTOR PLAN 3
Assessment and Plan: labs - cbc,pt/ptt,bmp,t&s,nose cx,ekg  M/C required  preop 3 day hibiclens instruction reviewed and given .instructed on if  nose cx positive use mupuricin 5 days and checklist given  take routine meds DOS with sips of water. avoid NSAID and OTC supplements. verbalized understanding  information on proper nutrition , increase protein and better food choices provided in packet  ensure clear  patient instructed on having covid19 swab 3 days prior to surgery  anesthesiologist to review pst labs, ekg, medical clearances and optimization for surgery

## 2021-10-14 NOTE — PHYSICAL THERAPY INITIAL EVALUATION ADULT - ADDITIONAL COMMENTS
Pt lives with her  (whom can provide assist upon D/C home) in an apartment, 3 entry steps (no rails), 10 steps down to apartment (no rails). Pt has a walk-in shower with a fixed shower head, standard toilet seat height, & no grab bars. Pt states she is currently independent with all functional mobility including community ambulation with a straight cane as needed. Pt states that she does not use a cane inside the house, only when she is ambulating long distance. Pt also owns RW & straight cane (both in good working condition & easily accessible). Pt states she is independent with ADL's as well. Pt reports daily 0/10 pain & states it is worse with any activity 8/10. Pt is right hand dominant, wears eye glasses for distance and reading & is retired.  Pt reports she has the most difficulty time "getting up & moving around" after prolonged sitting. Pt endorses taking Hydrocodone 5mg every morning for pain management. Goal of therapy: manage pain & improve functional mobility.

## 2021-10-14 NOTE — OCCUPATIONAL THERAPY INITIAL EVALUATION ADULT - PERTINENT HX OF CURRENT PROBLEM, REHAB EVAL
R knee OA which impacts pts ability to perform functional tasks/transfers and mobility. Pt is scheduled for R TKR on 10/28/21.

## 2021-10-14 NOTE — H&P PST ADULT - ASSESSMENT
right knee osteoarthritis  CAPRINI SCORE    AGE RELATED RISK FACTORS                                                       MOBILITY RELATED FACTORS  [ ] Age 41-60 years                                            (1 Point)                  [ ] Bed rest                                                        (1 Point)  [ ] Age: 61-74 years                                           (2 Points)                [ ] Plaster cast                                                   (2 Points)  [x ] Age= 75 years                                              (3 Points)                 [ ] Bed bound for more than 72 hours                   (2 Points)    DISEASE RELATED RISK FACTORS                                               GENDER SPECIFIC FACTORS  [ ] Edema in the lower extremities                       (1 Point)                  [ ] Pregnancy                                                     (1 Point)  [ ] Varicose veins                                               (1 Point)                  [ ] Post-partum < 6 weeks                                   (1 Point)             [x ] BMI > 25 Kg/m2                                            (1 Point)                  [ ] Hormonal therapy  or oral contraception            (1 Point)                 [ ] Sepsis (in the previous month)                        (1 Point)                  [ ] History of pregnancy complications  [ ] Pneumonia or serious lung disease                                               [ ] Unexplained or recurrent                       (1 Point)           (in the previous month)                               (1 Point)  [ ] Abnormal pulmonary function test                     (1 Point)                 SURGERY RELATED RISK FACTORS  [ ] Acute myocardial infarction                              (1 Point)                 [ ]  Section                                            (1 Point)  [ ] Congestive heart failure (in the previous month)  (1 Point)                 [ ] Minor surgery                                                 (1 Point)   [ ] Inflammatory bowel disease                             (1 Point)                 [ ] Arthroscopic surgery                                        (2 Points)  [ ] Central venous access                                    (2 Points)                [ ] General surgery lasting more than 45 minutes   (2 Points)       [ ] Stroke (in the previous month)                          (5 Points)               [x ] Elective arthroplasty                                        (5 Points)                                                                                                                                               HEMATOLOGY RELATED FACTORS                                                 TRAUMA RELATED RISK FACTORS  [ ] Prior episodes of VTE                                     (3 Points)                 [ ] Fracture of the hip, pelvis, or leg                       (5 Points)  [ ] Positive family history for VTE                         (3 Points)                 [ ] Acute spinal cord injury (in the previous month)  (5 Points)  [ ] Prothrombin 91394 A                                      (3 Points)                 [ ] Paralysis  (less than 1 month)                          (5 Points)  [ ] Factor V Leiden                                             (3 Points)                 [ ] Multiple Trauma within 1 month                         (5 Points)  [ ] Lupus anticoagulants                                     (3 Points)                                                           [ ] Anticardiolipin antibodies                                (3 Points)                                                       [ ] High homocysteine in the blood                      (3 Points)                                             [ ] Other congenital or acquired thrombophilia       (3 Points)                                                [ ] Heparin induced thrombocytopenia                  (3 Points)                                          Total Score [    9      ]   Caprini Score 0-2: Low risk, No VTE Prophylaxis required for most patient's, encourage ambulation  Caprini Score 3-6: At Risk, Pharmacologic VTE prophylaxis is indicated for most patients ( in the absence of a contraindication)  Caprini Score Greater than or = 7: High Risk , pharmacologic VTE is indicated for most patients ( in the absence of a contraindication)    Caprini score indicates that the patient is high risk for VTE event ( score 6 or greater). Surgical patient's in this group will benefit from both pharmacologic prophylaxis and intermittent compression devices . Surgical team will determine the balance between VTE  risk and bleeding risk and other clinical considerations

## 2021-10-14 NOTE — H&P PST ADULT - NEUROLOGICAL SYMPTOMS
neuropathy from knees down bilaterally, difficulty walking due to spinal stenosis and peripherl neurapathy/paresthesias/vertigo/loss of sensation/difficulty walking

## 2021-10-14 NOTE — PHYSICAL THERAPY INITIAL EVALUATION ADULT - PERTINENT HX OF CURRENT PROBLEM, REHAB EVAL
Patient attends pre-op testing today following consult c Dr. Koch due to chronic pain to right knee. Elective R TKA is now scheduled in this facility for 10/28/2021.

## 2021-10-14 NOTE — OCCUPATIONAL THERAPY INITIAL EVALUATION ADULT - ADDITIONAL COMMENTS
Pt lives with spouse (Who can assist post op) in an apartment with 3 steps to enter with no rails. Once inside, the pt has 10 steps with a "Banister" to reach the main floor where the bedroom and bathroom is. The pts bathroom has a walk in shower stall, fixed shower head, standard toilet seat and no grab bars. Pt reports that a 3/1 commode can fit over the toilet at home. The pt ambulates with a cane all the time and reports that she has a rolling walker at home. The pt daily pain is a 0/10 at rest and a 8/10 with movement. The pt manages the pain with rest and Hydrocodone 5mg 1x in the morning. The pt has no recent outpatient PT, no recent falls no buckling of the knees reported. The pt wears glasses all the time, R handed, does not drive and has no hearing impairments.

## 2021-10-14 NOTE — H&P PST ADULT - OTHER CARE PROVIDERS
Dr. Tamir Valdivia Cardiologist [phone]  Dr. Mazariegos Heart Surgeon [phone] (pacemaker)  Dr Dawson Sunshine [phone]

## 2021-10-27 ENCOUNTER — TRANSCRIPTION ENCOUNTER (OUTPATIENT)
Age: 81
End: 2021-10-27

## 2021-10-28 ENCOUNTER — RESULT REVIEW (OUTPATIENT)
Age: 81
End: 2021-10-28

## 2021-10-28 ENCOUNTER — INPATIENT (INPATIENT)
Facility: HOSPITAL | Age: 81
LOS: 0 days | Discharge: HOME | End: 2021-10-29
Attending: ORTHOPAEDIC SURGERY | Admitting: ORTHOPAEDIC SURGERY
Payer: MEDICARE

## 2021-10-28 ENCOUNTER — TRANSCRIPTION ENCOUNTER (OUTPATIENT)
Age: 81
End: 2021-10-28

## 2021-10-28 VITALS
HEART RATE: 78 BPM | RESPIRATION RATE: 18 BRPM | HEIGHT: 62 IN | WEIGHT: 141.1 LBS | OXYGEN SATURATION: 97 % | DIASTOLIC BLOOD PRESSURE: 78 MMHG | TEMPERATURE: 99 F | SYSTOLIC BLOOD PRESSURE: 143 MMHG

## 2021-10-28 DIAGNOSIS — Z98.1 ARTHRODESIS STATUS: Chronic | ICD-10-CM

## 2021-10-28 DIAGNOSIS — Z90.710 ACQUIRED ABSENCE OF BOTH CERVIX AND UTERUS: Chronic | ICD-10-CM

## 2021-10-28 DIAGNOSIS — Z95.0 PRESENCE OF CARDIAC PACEMAKER: Chronic | ICD-10-CM

## 2021-10-28 LAB
ANION GAP SERPL CALC-SCNC: 4 MMOL/L — LOW (ref 5–17)
BUN SERPL-MCNC: 15 MG/DL — SIGNIFICANT CHANGE UP (ref 7–23)
CALCIUM SERPL-MCNC: 7.7 MG/DL — LOW (ref 8.5–10.1)
CHLORIDE SERPL-SCNC: 111 MMOL/L — HIGH (ref 96–108)
CO2 SERPL-SCNC: 29 MMOL/L — SIGNIFICANT CHANGE UP (ref 22–31)
CREAT SERPL-MCNC: 0.52 MG/DL — SIGNIFICANT CHANGE UP (ref 0.5–1.3)
GLUCOSE SERPL-MCNC: 97 MG/DL — SIGNIFICANT CHANGE UP (ref 70–99)
HCT VFR BLD CALC: 37.3 % — SIGNIFICANT CHANGE UP (ref 34.5–45)
HGB BLD-MCNC: 12.4 G/DL — SIGNIFICANT CHANGE UP (ref 11.5–15.5)
MCHC RBC-ENTMCNC: 32.5 PG — SIGNIFICANT CHANGE UP (ref 27–34)
MCHC RBC-ENTMCNC: 33.2 GM/DL — SIGNIFICANT CHANGE UP (ref 32–36)
MCV RBC AUTO: 97.9 FL — SIGNIFICANT CHANGE UP (ref 80–100)
NRBC # BLD: 0 /100 WBCS — SIGNIFICANT CHANGE UP (ref 0–0)
PLATELET # BLD AUTO: 252 K/UL — SIGNIFICANT CHANGE UP (ref 150–400)
POTASSIUM SERPL-MCNC: 4.8 MMOL/L — SIGNIFICANT CHANGE UP (ref 3.5–5.3)
POTASSIUM SERPL-SCNC: 4.8 MMOL/L — SIGNIFICANT CHANGE UP (ref 3.5–5.3)
RBC # BLD: 3.81 M/UL — SIGNIFICANT CHANGE UP (ref 3.8–5.2)
RBC # FLD: 13.3 % — SIGNIFICANT CHANGE UP (ref 10.3–14.5)
SODIUM SERPL-SCNC: 144 MMOL/L — SIGNIFICANT CHANGE UP (ref 135–145)
WBC # BLD: 11.37 K/UL — HIGH (ref 3.8–10.5)
WBC # FLD AUTO: 11.37 K/UL — HIGH (ref 3.8–10.5)

## 2021-10-28 PROCEDURE — 88305 TISSUE EXAM BY PATHOLOGIST: CPT | Mod: 26

## 2021-10-28 PROCEDURE — 73560 X-RAY EXAM OF KNEE 1 OR 2: CPT | Mod: 26,RT

## 2021-10-28 PROCEDURE — 88311 DECALCIFY TISSUE: CPT | Mod: 26

## 2021-10-28 RX ORDER — ASCORBIC ACID 60 MG
500 TABLET,CHEWABLE ORAL
Refills: 0 | Status: DISCONTINUED | OUTPATIENT
Start: 2021-10-28 | End: 2021-10-29

## 2021-10-28 RX ORDER — KETOROLAC TROMETHAMINE 30 MG/ML
15 SYRINGE (ML) INJECTION EVERY 8 HOURS
Refills: 0 | Status: DISCONTINUED | OUTPATIENT
Start: 2021-10-28 | End: 2021-10-29

## 2021-10-28 RX ORDER — PANTOPRAZOLE SODIUM 20 MG/1
40 TABLET, DELAYED RELEASE ORAL
Refills: 0 | Status: DISCONTINUED | OUTPATIENT
Start: 2021-10-28 | End: 2021-10-29

## 2021-10-28 RX ORDER — SODIUM CHLORIDE 9 MG/ML
1000 INJECTION, SOLUTION INTRAVENOUS
Refills: 0 | Status: DISCONTINUED | OUTPATIENT
Start: 2021-10-28 | End: 2021-10-29

## 2021-10-28 RX ORDER — LANOLIN ALCOHOL/MO/W.PET/CERES
3 CREAM (GRAM) TOPICAL AT BEDTIME
Refills: 0 | Status: DISCONTINUED | OUTPATIENT
Start: 2021-10-28 | End: 2021-10-29

## 2021-10-28 RX ORDER — HYDROMORPHONE HYDROCHLORIDE 2 MG/ML
1 INJECTION INTRAMUSCULAR; INTRAVENOUS; SUBCUTANEOUS EVERY 4 HOURS
Refills: 0 | Status: DISCONTINUED | OUTPATIENT
Start: 2021-10-28 | End: 2021-10-29

## 2021-10-28 RX ORDER — ESTROGENS, CONJUGATED 0.625 MG
0 TABLET ORAL
Qty: 0 | Refills: 0 | DISCHARGE

## 2021-10-28 RX ORDER — CELECOXIB 200 MG/1
200 CAPSULE ORAL EVERY 12 HOURS
Refills: 0 | Status: DISCONTINUED | OUTPATIENT
Start: 2021-10-29 | End: 2021-10-29

## 2021-10-28 RX ORDER — MAGNESIUM HYDROXIDE 400 MG/1
30 TABLET, CHEWABLE ORAL DAILY
Refills: 0 | Status: DISCONTINUED | OUTPATIENT
Start: 2021-10-28 | End: 2021-10-29

## 2021-10-28 RX ORDER — INFLUENZA VIRUS VACCINE 15; 15; 15; 15 UG/.5ML; UG/.5ML; UG/.5ML; UG/.5ML
0.7 SUSPENSION INTRAMUSCULAR ONCE
Refills: 0 | Status: DISCONTINUED | OUTPATIENT
Start: 2021-10-28 | End: 2021-10-29

## 2021-10-28 RX ORDER — KETOROLAC TROMETHAMINE 30 MG/ML
30 SYRINGE (ML) INJECTION EVERY 6 HOURS
Refills: 0 | Status: DISCONTINUED | OUTPATIENT
Start: 2021-10-28 | End: 2021-10-28

## 2021-10-28 RX ORDER — ONDANSETRON 8 MG/1
4 TABLET, FILM COATED ORAL EVERY 8 HOURS
Refills: 0 | Status: DISCONTINUED | OUTPATIENT
Start: 2021-10-28 | End: 2021-10-29

## 2021-10-28 RX ORDER — ACETAMINOPHEN 500 MG
975 TABLET ORAL EVERY 8 HOURS
Refills: 0 | Status: DISCONTINUED | OUTPATIENT
Start: 2021-10-28 | End: 2021-10-29

## 2021-10-28 RX ORDER — CELECOXIB 200 MG/1
200 CAPSULE ORAL ONCE
Refills: 0 | Status: COMPLETED | OUTPATIENT
Start: 2021-10-28 | End: 2021-10-28

## 2021-10-28 RX ORDER — ONDANSETRON 8 MG/1
4 TABLET, FILM COATED ORAL ONCE
Refills: 0 | Status: DISCONTINUED | OUTPATIENT
Start: 2021-10-28 | End: 2021-10-28

## 2021-10-28 RX ORDER — CEFAZOLIN SODIUM 1 G
2000 VIAL (EA) INJECTION EVERY 8 HOURS
Refills: 0 | Status: COMPLETED | OUTPATIENT
Start: 2021-10-28 | End: 2021-10-29

## 2021-10-28 RX ORDER — ACETAMINOPHEN 500 MG
1000 TABLET ORAL ONCE
Refills: 0 | Status: DISCONTINUED | OUTPATIENT
Start: 2021-10-28 | End: 2021-10-29

## 2021-10-28 RX ORDER — OXYCODONE HYDROCHLORIDE 5 MG/1
5 TABLET ORAL EVERY 4 HOURS
Refills: 0 | Status: DISCONTINUED | OUTPATIENT
Start: 2021-10-28 | End: 2021-10-29

## 2021-10-28 RX ORDER — MECLIZINE HCL 12.5 MG
1 TABLET ORAL
Qty: 0 | Refills: 0 | DISCHARGE

## 2021-10-28 RX ORDER — LABETALOL HCL 100 MG
10 TABLET ORAL
Refills: 0 | Status: DISCONTINUED | OUTPATIENT
Start: 2021-10-28 | End: 2021-10-29

## 2021-10-28 RX ORDER — ACETAMINOPHEN 500 MG
650 TABLET ORAL ONCE
Refills: 0 | Status: COMPLETED | OUTPATIENT
Start: 2021-10-28 | End: 2021-10-28

## 2021-10-28 RX ORDER — GABAPENTIN 400 MG/1
100 CAPSULE ORAL THREE TIMES A DAY
Refills: 0 | Status: DISCONTINUED | OUTPATIENT
Start: 2021-10-28 | End: 2021-10-29

## 2021-10-28 RX ORDER — HYDROMORPHONE HYDROCHLORIDE 2 MG/ML
0.5 INJECTION INTRAMUSCULAR; INTRAVENOUS; SUBCUTANEOUS
Refills: 0 | Status: DISCONTINUED | OUTPATIENT
Start: 2021-10-28 | End: 2021-10-28

## 2021-10-28 RX ORDER — ASPIRIN/CALCIUM CARB/MAGNESIUM 324 MG
81 TABLET ORAL
Refills: 0 | Status: DISCONTINUED | OUTPATIENT
Start: 2021-10-29 | End: 2021-10-29

## 2021-10-28 RX ORDER — OXYCODONE HYDROCHLORIDE 5 MG/1
10 TABLET ORAL
Refills: 0 | Status: DISCONTINUED | OUTPATIENT
Start: 2021-10-28 | End: 2021-10-29

## 2021-10-28 RX ORDER — CYCLOBENZAPRINE HYDROCHLORIDE 10 MG/1
5 TABLET, FILM COATED ORAL THREE TIMES A DAY
Refills: 0 | Status: DISCONTINUED | OUTPATIENT
Start: 2021-10-28 | End: 2021-10-29

## 2021-10-28 RX ORDER — MECLIZINE HCL 12.5 MG
25 TABLET ORAL THREE TIMES A DAY
Refills: 0 | Status: DISCONTINUED | OUTPATIENT
Start: 2021-10-28 | End: 2021-10-29

## 2021-10-28 RX ORDER — POLYETHYLENE GLYCOL 3350 17 G/17G
17 POWDER, FOR SOLUTION ORAL AT BEDTIME
Refills: 0 | Status: DISCONTINUED | OUTPATIENT
Start: 2021-10-28 | End: 2021-10-29

## 2021-10-28 RX ORDER — SODIUM CHLORIDE 9 MG/ML
1000 INJECTION, SOLUTION INTRAVENOUS
Refills: 0 | Status: DISCONTINUED | OUTPATIENT
Start: 2021-10-28 | End: 2021-10-28

## 2021-10-28 RX ORDER — SODIUM CHLORIDE 9 MG/ML
3 INJECTION INTRAMUSCULAR; INTRAVENOUS; SUBCUTANEOUS EVERY 8 HOURS
Refills: 0 | Status: DISCONTINUED | OUTPATIENT
Start: 2021-10-28 | End: 2021-10-28

## 2021-10-28 RX ORDER — SENNA PLUS 8.6 MG/1
2 TABLET ORAL AT BEDTIME
Refills: 0 | Status: DISCONTINUED | OUTPATIENT
Start: 2021-10-28 | End: 2021-10-29

## 2021-10-28 RX ORDER — OXYCODONE HYDROCHLORIDE 5 MG/1
5 TABLET ORAL
Refills: 0 | Status: DISCONTINUED | OUTPATIENT
Start: 2021-10-28 | End: 2021-10-29

## 2021-10-28 RX ORDER — DEXAMETHASONE 0.5 MG/5ML
10 ELIXIR ORAL ONCE
Refills: 0 | Status: COMPLETED | OUTPATIENT
Start: 2021-10-29 | End: 2021-10-29

## 2021-10-28 RX ADMIN — Medication 10 MILLIGRAM(S): at 16:37

## 2021-10-28 RX ADMIN — Medication 3 MILLIGRAM(S): at 23:59

## 2021-10-28 RX ADMIN — GABAPENTIN 100 MILLIGRAM(S): 400 CAPSULE ORAL at 21:24

## 2021-10-28 RX ADMIN — Medication 3 MILLIGRAM(S): at 21:24

## 2021-10-28 RX ADMIN — Medication 975 MILLIGRAM(S): at 22:24

## 2021-10-28 RX ADMIN — OXYCODONE HYDROCHLORIDE 10 MILLIGRAM(S): 5 TABLET ORAL at 18:33

## 2021-10-28 RX ADMIN — Medication 100 MILLIGRAM(S): at 21:24

## 2021-10-28 RX ADMIN — OXYCODONE HYDROCHLORIDE 5 MILLIGRAM(S): 5 TABLET ORAL at 22:24

## 2021-10-28 RX ADMIN — SENNA PLUS 2 TABLET(S): 8.6 TABLET ORAL at 21:25

## 2021-10-28 RX ADMIN — SODIUM CHLORIDE 50 MILLILITER(S): 9 INJECTION, SOLUTION INTRAVENOUS at 16:10

## 2021-10-28 RX ADMIN — OXYCODONE HYDROCHLORIDE 5 MILLIGRAM(S): 5 TABLET ORAL at 21:24

## 2021-10-28 RX ADMIN — Medication 650 MILLIGRAM(S): at 12:03

## 2021-10-28 RX ADMIN — Medication 500 MILLIGRAM(S): at 17:34

## 2021-10-28 RX ADMIN — Medication 975 MILLIGRAM(S): at 21:24

## 2021-10-28 RX ADMIN — CELECOXIB 200 MILLIGRAM(S): 200 CAPSULE ORAL at 12:03

## 2021-10-28 RX ADMIN — SODIUM CHLORIDE 3 MILLILITER(S): 9 INJECTION INTRAMUSCULAR; INTRAVENOUS; SUBCUTANEOUS at 12:03

## 2021-10-28 RX ADMIN — SODIUM CHLORIDE 110 MILLILITER(S): 9 INJECTION, SOLUTION INTRAVENOUS at 19:30

## 2021-10-28 RX ADMIN — POLYETHYLENE GLYCOL 3350 17 GRAM(S): 17 POWDER, FOR SOLUTION ORAL at 21:23

## 2021-10-28 RX ADMIN — OXYCODONE HYDROCHLORIDE 10 MILLIGRAM(S): 5 TABLET ORAL at 17:34

## 2021-10-28 RX ADMIN — HYDROMORPHONE HYDROCHLORIDE 0.5 MILLIGRAM(S): 2 INJECTION INTRAMUSCULAR; INTRAVENOUS; SUBCUTANEOUS at 16:29

## 2021-10-28 NOTE — PHARMACOTHERAPY INTERVENTION NOTE - COMMENTS
Spoke to PA and recommended to decrease maintenance fluids to 110ml/hr based on their weight of 63kg. PA agree to change.

## 2021-10-28 NOTE — DISCHARGE NOTE PROVIDER - HOSPITAL COURSE
81yFemale with history of Right Knee Osteoarthritis presenting for Right TKA by Dr. Koch on 10/28/21. Risk and benefits of surgery were explained to the patient. The patient understood and agreed to proceed with surgery. Patient underwent the procedure with no intraoperative complications. Pt was brought in stable condition to the PACU. Once stable in PACU, pt was brought to the floor. During hospital stay pt was followed by Medicine, physical therapy, Home Care during this admission. Pt had an uneventful hospital course. Pt is stable for discharge to home

## 2021-10-28 NOTE — CONSULT NOTE ADULT - SUBJECTIVE AND OBJECTIVE BOX
KATLYN FORBES is a 81y Female s/p RIGHT TOTAL KNEE ARTHROPLASTY      w/ h/o History of bradycardia    S/P partial hysterectomy    Spinal stenosis      denies any chest pain shortness of breath palpitation dizziness lightheadedness nausea vomiting fever or chills    History of hysteroscopy    Cardiac pacemaker    H/O: hysterectomy    S/P spinal fusion    S/P spinal fusion      FH: type 2 diabetes (Mother)      SH: doesnot smoke or drink at this time    No Known Allergies    acetaminophen     Tablet .. 975 milliGRAM(s) Oral every 8 hours  acetaminophen   IVPB .. 1000 milliGRAM(s) IV Intermittent once  aluminum hydroxide/magnesium hydroxide/simethicone Suspension 30 milliLiter(s) Oral four times a day PRN  ascorbic acid 500 milliGRAM(s) Oral two times a day  ceFAZolin   IVPB 2000 milliGRAM(s) IV Intermittent every 8 hours  cyclobenzaprine 5 milliGRAM(s) Oral three times a day PRN  gabapentin 100 milliGRAM(s) Oral three times a day  HYDROmorphone  Injectable 1 milliGRAM(s) IV Push every 4 hours PRN  influenza  Vaccine (HIGH DOSE) 0.7 milliLiter(s) IntraMuscular once  ketorolac   Injectable 15 milliGRAM(s) IV Push every 8 hours PRN  labetalol Injectable 10 milliGRAM(s) IV Push every 15 minutes PRN  lactated ringers. 1000 milliLiter(s) IV Continuous <Continuous>  magnesium hydroxide Suspension 30 milliLiter(s) Oral daily PRN  meclizine 25 milliGRAM(s) Oral three times a day PRN  multivitamin 1 Tablet(s) Oral daily  ondansetron Injectable 4 milliGRAM(s) IV Push every 8 hours PRN  oxyCODONE    IR 5 milliGRAM(s) Oral every 3 hours PRN  oxyCODONE    IR 10 milliGRAM(s) Oral every 3 hours PRN  oxyCODONE    IR 5 milliGRAM(s) Oral every 4 hours  pantoprazole    Tablet 40 milliGRAM(s) Oral before breakfast  polyethylene glycol 3350 17 Gram(s) Oral at bedtime  senna 2 Tablet(s) Oral at bedtime    T(C): 36.5 (10-28-21 @ 19:15), Max: 37.1 (10-28-21 @ 11:13)  HR: 75 (10-28-21 @ 19:15) (71 - 85)  BP: 149/78 (10-28-21 @ 19:15) (143/78 - 196/89)  RR: 15 (10-28-21 @ 19:15) (13 - 18)  SpO2: 94% (10-28-21 @ 19:15) (94% - 99%)  HEENT unremarkable  neck no JVD or bruit  heart normal S1 S2 RRR no gallops or rubs  chest clear to auscultation  abd sof nontender non distended +bs  ext no calf tenderness    A/P   DVT PX  pain control  bowel regimen   wound care as per ortho  GI PX  antiemetics prn  incentive spirometer

## 2021-10-28 NOTE — DISCHARGE NOTE PROVIDER - CARE PROVIDER_API CALL
Michael Field)  Orthopaedic Surgery  [address] [place], [place] [address]  Phone: [phone]  Fax: [phone]  Follow Up Time:    Russel Koch)  Orthopaedic Surgery  [address] [place] [address]  Phone: [phone]  Fax: [phone]  Follow Up Time:

## 2021-10-28 NOTE — OCCUPATIONAL THERAPY INITIAL EVALUATION ADULT - TRANSFER SAFETY CONCERNS NOTED: SIT/STAND, REHAB EVAL
10/20/2017 8:37 AM 
 
Ms. Christina Haro [address] [place] [address] Dear Ms. Carmelina, We have been unable to reach you by phone to notify you of your test results. Please call our office at [phone] and ask to speak with my nurse in order to explain these results to you and advise you of any recommendations.  
 
 
 
Sincerely, 
 
 
Juan Keller MD 

decreased step length/decreased weight-shifting ability

## 2021-10-28 NOTE — DISCHARGE NOTE PROVIDER - NSDCMRMEDTOKEN_GEN_ALL_CORE_FT
acetaminophen 325 mg oral tablet: 3 tab(s) orally every 8 hours  biotin: 1 tab(s) orally once a day  cyclobenzaprine 5 mg oral tablet: 1 tab(s) orally 3 times a day, As needed, Muscle Spasm MDD:3  docusate sodium 100 mg oral capsule: 1 cap(s) orally 3 times a day  gabapentin 100 mg oral capsule: 1 cap(s) orally 3 times a day MDD:3  HYDROcodone: 1 tab(s) orally once a day, As Needed  meclizine 25 mg oral tablet: 1 tab(s) orally 3 times a day, As Needed  Multiple Vitamins oral tablet: 1 tab(s) orally once a day  pantoprazole 40 mg oral delayed release tablet: 1 tab(s) orally once a day MDD:1  Premarin:   Premarin 0.625 mg oral tablet: 1 tab(s) orally once a day  Rolling Walker: 1 Rolling walker   senna oral tablet: 2 tab(s) orally once a day (at bedtime)  traMADol 50 mg oral tablet: 0.5 tab(s) orally 3 times a day MDD:3  Vitamin C: 1 tab(s) orally once a day  vitamin d: 1 tab(s) orally once a day   acetaminophen 325 mg oral tablet: 3 tab(s) orally every 8 hours  aspirin 81 mg oral delayed release tablet: 1 tab(s) orally 2 times a day for DVT prophylaxis   biotin: 1 tab(s) orally once a day  celecoxib 200 mg oral capsule: 1 cap(s) orally every 12 hours  cyclobenzaprine 5 mg oral tablet: 1 tab(s) orally 3 times a day, As needed, Muscle Spasm MDD:3  docusate sodium 100 mg oral capsule: 1 cap(s) orally 3 times a day  Please take this while on pain medications to avoid constipation   gabapentin 100 mg oral capsule: 1 cap(s) orally 3 times a day MDD:3  meclizine 25 mg oral tablet: 1 tab(s) orally 3 times a day, As Needed  Multiple Vitamins oral tablet: 1 tab(s) orally once a day  oxyCODONE 5 mg oral tablet: 1-2 tabs orally every 4 hours prn pain   MDD 12  pantoprazole 40 mg oral delayed release tablet: 1 tab(s) orally once a day MDD:1  Premarin:   Premarin 0.625 mg oral tablet: 1 tab(s) orally once a day  Vitamin C: 1 tab(s) orally once a day

## 2021-10-28 NOTE — OCCUPATIONAL THERAPY INITIAL EVALUATION ADULT - ADDITIONAL COMMENTS
Pt lives with spouse (Who can assist post op) in an apartment with 3 steps to enter with no rails. Once inside, the pt has 10 steps with a "Banister" to reach the main floor where the bedroom and bathroom is. The pts bathroom has a walk in shower stall, fixed shower head, standard toilet seat and no grab bars. Pt reported she has rolling walker and 3:1 commode at home

## 2021-10-28 NOTE — OCCUPATIONAL THERAPY INITIAL EVALUATION ADULT - GENERAL OBSERVATIONS, REHAB EVAL
Pt encountered supine in bed, NAD, all lines intact, pt c/o of pain 4/10 to R Knee s/p R TKA, pt agreeable to OT juan diego.

## 2021-10-28 NOTE — PHARMACOTHERAPY INTERVENTION NOTE - COMMENTS
Spoke to PA and recommended to change zofran 8mg iv q 8h to 4mg q8h prn based on their EKG and slightly prolonged QT. PA agreed to decrease dose to 4mg q8h prn

## 2021-10-29 ENCOUNTER — TRANSCRIPTION ENCOUNTER (OUTPATIENT)
Age: 81
End: 2021-10-29

## 2021-10-29 VITALS — SYSTOLIC BLOOD PRESSURE: 128 MMHG | HEART RATE: 71 BPM | DIASTOLIC BLOOD PRESSURE: 55 MMHG

## 2021-10-29 LAB
ANION GAP SERPL CALC-SCNC: 6 MMOL/L — SIGNIFICANT CHANGE UP (ref 5–17)
BUN SERPL-MCNC: 13 MG/DL — SIGNIFICANT CHANGE UP (ref 7–23)
CALCIUM SERPL-MCNC: 8.6 MG/DL — SIGNIFICANT CHANGE UP (ref 8.5–10.1)
CHLORIDE SERPL-SCNC: 107 MMOL/L — SIGNIFICANT CHANGE UP (ref 96–108)
CO2 SERPL-SCNC: 27 MMOL/L — SIGNIFICANT CHANGE UP (ref 22–31)
COVID-19 SPIKE DOMAIN AB INTERP: POSITIVE
COVID-19 SPIKE DOMAIN ANTIBODY RESULT: >250 U/ML — HIGH
CREAT SERPL-MCNC: 0.78 MG/DL — SIGNIFICANT CHANGE UP (ref 0.5–1.3)
GLUCOSE SERPL-MCNC: 229 MG/DL — HIGH (ref 70–99)
HCT VFR BLD CALC: 35.8 % — SIGNIFICANT CHANGE UP (ref 34.5–45)
HGB BLD-MCNC: 11.9 G/DL — SIGNIFICANT CHANGE UP (ref 11.5–15.5)
MCHC RBC-ENTMCNC: 32.2 PG — SIGNIFICANT CHANGE UP (ref 27–34)
MCHC RBC-ENTMCNC: 33.2 GM/DL — SIGNIFICANT CHANGE UP (ref 32–36)
MCV RBC AUTO: 97 FL — SIGNIFICANT CHANGE UP (ref 80–100)
NRBC # BLD: 0 /100 WBCS — SIGNIFICANT CHANGE UP (ref 0–0)
PLATELET # BLD AUTO: 254 K/UL — SIGNIFICANT CHANGE UP (ref 150–400)
POTASSIUM SERPL-MCNC: 4 MMOL/L — SIGNIFICANT CHANGE UP (ref 3.5–5.3)
POTASSIUM SERPL-SCNC: 4 MMOL/L — SIGNIFICANT CHANGE UP (ref 3.5–5.3)
RBC # BLD: 3.69 M/UL — LOW (ref 3.8–5.2)
RBC # FLD: 13.1 % — SIGNIFICANT CHANGE UP (ref 10.3–14.5)
SARS-COV-2 IGG+IGM SERPL QL IA: >250 U/ML — HIGH
SARS-COV-2 IGG+IGM SERPL QL IA: POSITIVE
SODIUM SERPL-SCNC: 140 MMOL/L — SIGNIFICANT CHANGE UP (ref 135–145)
WBC # BLD: 16.17 K/UL — HIGH (ref 3.8–10.5)
WBC # FLD AUTO: 16.17 K/UL — HIGH (ref 3.8–10.5)

## 2021-10-29 RX ORDER — OXYCODONE HYDROCHLORIDE 5 MG/1
1 TABLET ORAL
Qty: 60 | Refills: 0
Start: 2021-10-29 | End: 2021-11-02

## 2021-10-29 RX ORDER — HYDROCODONE BITARTRATE 50 MG/1
1 CAPSULE, EXTENDED RELEASE ORAL
Qty: 0 | Refills: 0 | DISCHARGE

## 2021-10-29 RX ORDER — ASPIRIN/CALCIUM CARB/MAGNESIUM 324 MG
1 TABLET ORAL
Qty: 60 | Refills: 0
Start: 2021-10-29 | End: 2021-11-27

## 2021-10-29 RX ORDER — CELECOXIB 200 MG/1
1 CAPSULE ORAL
Qty: 60 | Refills: 0
Start: 2021-10-29 | End: 2021-11-27

## 2021-10-29 RX ORDER — DOCUSATE SODIUM 100 MG
1 CAPSULE ORAL
Qty: 21 | Refills: 0
Start: 2021-10-29 | End: 2021-11-04

## 2021-10-29 RX ADMIN — Medication 102 MILLIGRAM(S): at 05:29

## 2021-10-29 RX ADMIN — Medication 975 MILLIGRAM(S): at 15:40

## 2021-10-29 RX ADMIN — Medication 975 MILLIGRAM(S): at 06:30

## 2021-10-29 RX ADMIN — OXYCODONE HYDROCHLORIDE 5 MILLIGRAM(S): 5 TABLET ORAL at 06:30

## 2021-10-29 RX ADMIN — CELECOXIB 200 MILLIGRAM(S): 200 CAPSULE ORAL at 05:30

## 2021-10-29 RX ADMIN — Medication 1 TABLET(S): at 11:54

## 2021-10-29 RX ADMIN — OXYCODONE HYDROCHLORIDE 5 MILLIGRAM(S): 5 TABLET ORAL at 05:30

## 2021-10-29 RX ADMIN — Medication 500 MILLIGRAM(S): at 05:29

## 2021-10-29 RX ADMIN — GABAPENTIN 100 MILLIGRAM(S): 400 CAPSULE ORAL at 05:29

## 2021-10-29 RX ADMIN — SODIUM CHLORIDE 110 MILLILITER(S): 9 INJECTION, SOLUTION INTRAVENOUS at 05:29

## 2021-10-29 RX ADMIN — CELECOXIB 200 MILLIGRAM(S): 200 CAPSULE ORAL at 06:30

## 2021-10-29 RX ADMIN — OXYCODONE HYDROCHLORIDE 10 MILLIGRAM(S): 5 TABLET ORAL at 10:50

## 2021-10-29 RX ADMIN — OXYCODONE HYDROCHLORIDE 10 MILLIGRAM(S): 5 TABLET ORAL at 11:45

## 2021-10-29 RX ADMIN — Medication 81 MILLIGRAM(S): at 05:29

## 2021-10-29 RX ADMIN — Medication 975 MILLIGRAM(S): at 14:43

## 2021-10-29 RX ADMIN — GABAPENTIN 100 MILLIGRAM(S): 400 CAPSULE ORAL at 14:43

## 2021-10-29 RX ADMIN — PANTOPRAZOLE SODIUM 40 MILLIGRAM(S): 20 TABLET, DELAYED RELEASE ORAL at 05:29

## 2021-10-29 RX ADMIN — Medication 100 MILLIGRAM(S): at 05:29

## 2021-10-29 RX ADMIN — Medication 975 MILLIGRAM(S): at 05:30

## 2021-10-29 NOTE — PHYSICAL THERAPY INITIAL EVALUATION ADULT - TINETTI GAIT TEST, REHAB EVAL
Indication of gait -1/1,   Step Length and height -1/2,   Foot Clearance -2/2,   Step Symmetry - 0/1,  Step Continuity -1/1,   Path -1/ 2,   Trunk -1/2,   Walking Time -1/1,   Total Score -8 /12

## 2021-10-29 NOTE — PROGRESS NOTE ADULT - SUBJECTIVE AND OBJECTIVE BOX
Post-op Check   POD#0 S/P Right TKA  81yFemale Patient seen and examined, Pain controlled  Patient Denies SOB, CP, N/V/D       PE: Right Knee/LE: Dressing C/D/I, Sensation/motor intact, DP 2+, FROM ankle/toes   B/L LE: Skin intact. +ROM hip/knee/ankle/toes. Ankle Dorsi/plantarflexion: 5/5. Calf: soft, compressible and nontender. DP/PT 2+ NVI                          12.4   11.37 )-----------( 252      ( 28 Oct 2021 15:49 )             37.3       10-28    144  |  111<H>  |  15  ----------------------------<  97  4.8   |  29  |  0.52    Ca    7.7<L>      28 Oct 2021 15:49          A: As above   P: Pain Control       DVT Prophylaxis      Incentive spirometry      PT WBAT RLE       Isometric exercises      Discharge Planning      All the above discussed and understood by pt       Ortho to F/U 
81yFemale s/p R TKA  . Pt seen and examined in NAD. Pain controlled. Pt denies any new complaints. Pt denies CP/SOB/N/V/D/numbness/tingling/bowel or bladder dysfunction.     PE:   Neuro: AAOX3  RLE:  dressing in place .cdi  Dressing removed today  Prineo dressing in place .cdi  Calves soft non tender + ROM ankle and toes 2+ dp pulse  Sensation intact    B/L UE: Skin intact. +ROM shoulder/elbow/wrist/fingers. +ok/thumbsup/fingercross signs.  strength: 5/5.  RP2+ NVI.   B/L LE: Skin intact. +ROM hip/knee/ankle/toes. Ankle Dorsi/plantarflexion: 5/5. Calf: soft, compressible and nontender. DP/PT 2+ NVI.                             11.9   16.17 )-----------( 254      ( 29 Oct 2021 09:38 )             35.8       10-29    140  |  107  |  13  ----------------------------<  229<H>  4.0   |  27  |  0.78    Ca    8.6      29 Oct 2021 09:38          A/P: 81yFemale s/p R TKA    Pain controlled  PT: WBAT  RLE   DVT ppx: SCDs   Wound care, Isometric exercises, incentive spirometry   Medical consult appreciated  Discharge: planning   All the above discussed and understood by pt

## 2021-10-29 NOTE — PHYSICAL THERAPY INITIAL EVALUATION ADULT - TINETTI BALANCE TEST, REHAB EVAL
Sitting Balance - 1/1 , Rises From Chair - 1/2, Attempts to rise - 2/2 , Immediate Standing Balance - 2/2,  Standing Balance -2 /2, Nudged - 2 /2, Eyes Closed -1 /1,  Turning 360 Deg - 1 /2, Sitting down - 1/2, Balance Score - 13/16

## 2021-10-29 NOTE — PHYSICAL THERAPY INITIAL EVALUATION ADULT - ADDITIONAL COMMENTS
There are 3 steps, w/o rail, at the entry of the house and 10 steps, c R rail up,  to negotiate at home.  Pt has a walk in shower c fixed shower head,  no grab bar, and regular toilet seat in BR. 3-1 commode will fit in BR. Pt owns a Rolling Walker and straight cane and they are in good working condition. Pt received axillary crutches and 3-1 commode. Pt claimed her son who is in construction will install rail for the entry steps soon.

## 2021-10-29 NOTE — DISCHARGE NOTE NURSING/CASE MANAGEMENT/SOCIAL WORK - PATIENT PORTAL LINK FT
You can access the FollowMyHealth Patient Portal offered by [facility] by registering at the following website: [email or web address]. By joining [employer]’s FollowMyHealth portal, you will also be able to view your health information using other applications (apps) compatible with our system.

## 2021-10-29 NOTE — PHYSICAL THERAPY INITIAL EVALUATION ADULT - ASSISTIVE DEVICE FOR STAIR TRANSFER, REHAB EVAL
Pt also negotiated 6 steps c R rail up, L rail down , c a straight cane ,  under supervision./axillary crutches/no rail(s)

## 2021-10-29 NOTE — PHYSICAL THERAPY INITIAL EVALUATION ADULT - IMPAIRMENTS FOUND, PT EVAL
aerobic capacity/endurance/ergonomics and body mechanics/gait, locomotion, and balance/joint integrity and mobility/muscle strength/poor safety awareness/posture/ROM

## 2021-10-29 NOTE — PHYSICAL THERAPY INITIAL EVALUATION ADULT - GENERAL OBSERVATIONS, REHAB EVAL
Pt was seen in supine c R knee dressing C/D/I, alert and Ox4. Pt was very anxious and very eager to go home. Pt needed constant verbal cues for sequencing to perform all tasks

## 2021-10-29 NOTE — PHYSICAL THERAPY INITIAL EVALUATION ADULT - GAIT TRAINING, PT EVAL
Pt will independently ambulate 200 feet with rolling walker without loss of balance, by 2 weeks. Pt to be able to negotiate 3 steps c axillary crutches under supervision and to negotiate 10 steps, c a straight cane, c R rail up, L rail down, step to gait independently c good balance and w/o LOB,  in 4 weeks.

## 2021-10-31 DIAGNOSIS — M17.11 UNILATERAL PRIMARY OSTEOARTHRITIS, RIGHT KNEE: ICD-10-CM

## 2021-11-02 LAB — SURGICAL PATHOLOGY STUDY: SIGNIFICANT CHANGE UP

## 2022-01-03 PROBLEM — M48.00 SPINAL STENOSIS, SITE UNSPECIFIED: Chronic | Status: ACTIVE | Noted: 2019-07-15

## 2022-04-05 NOTE — ASU PATIENT PROFILE, ADULT - HEALTH/HEALTHCARE ANXIETIES, PROFILE
LAST VISIT 01/07/2022 WITH JUAN MACEDO CNP, NEXT VISIT NONE.   [address] [facility]
Pt states, she is nervous and anxious."

## 2022-04-13 ENCOUNTER — APPOINTMENT (OUTPATIENT)
Dept: INTERNAL MEDICINE | Facility: CLINIC | Age: 82
End: 2022-04-13

## 2022-04-19 RX ORDER — HYDROCODONE BITARTRATE AND ACETAMINOPHEN 5; 325 MG/1; MG/1
5-325 TABLET ORAL
Qty: 30 | Refills: 0 | Status: ACTIVE | COMMUNITY
Start: 2022-04-19 | End: 1900-01-01

## 2022-05-23 ENCOUNTER — APPOINTMENT (OUTPATIENT)
Dept: ORTHOPEDIC SURGERY | Facility: CLINIC | Age: 82
End: 2022-05-23
Payer: MEDICARE

## 2022-05-23 VITALS — BODY MASS INDEX: 28.71 KG/M2 | HEIGHT: 62 IN | WEIGHT: 156 LBS

## 2022-05-23 DIAGNOSIS — Z96.651 PRESENCE OF RIGHT ARTIFICIAL KNEE JOINT: ICD-10-CM

## 2022-05-23 PROCEDURE — 73562 X-RAY EXAM OF KNEE 3: CPT | Mod: RT

## 2022-05-23 PROCEDURE — 72100 X-RAY EXAM L-S SPINE 2/3 VWS: CPT

## 2022-05-23 PROCEDURE — 99214 OFFICE O/P EST MOD 30 MIN: CPT

## 2022-05-23 RX ORDER — GABAPENTIN 300 MG/1
300 CAPSULE ORAL 3 TIMES DAILY
Qty: 90 | Refills: 0 | Status: ACTIVE | COMMUNITY
Start: 2022-05-23 | End: 1900-01-01

## 2022-05-23 NOTE — IMAGING
[Right] : right knee [AP] : anteroposterior [Lateral] : lateral [Maeser] : skyline [Components well fixed, in good position] : Components well fixed, in good position [de-identified] : right knee:\par 0-120\par ligaments stable \par NVI\par mild effusion \par swelling on anterior medial aspect of incision\par pes anserine  tenderness\par \par lumbar tenderness - \par Decreased ROM  [FreeTextEntry1] : possible instability above fusion

## 2022-05-23 NOTE — ASSESSMENT
[FreeTextEntry1] : 8/9/21: Patient has adv OA in the right knee mostly medial with good ROM - She has failed all forms of conservative treatments and is ready to proceed with TKA. Risks and benefits discussed. I recommend she get a CT scan for preop planning but she is hesitant on this.\par 11/8/21: F/u 11 days s/p R TKA. XRs look good. Good ROM and doing great functionally. Pt struggling a little with pain, recommended continued HEP and stretching. Will refill meds. Rtc in 4 weeks.\par 12/13/21: 6 weeks s/p R TKA. Knee looks great, continue PT, and pt will start try to wean off oxy 5 mg which she is taking 4-5x per day. she is chronic user of Vicodin\par \par 5/23/22: 8 months s/p R TKA- Still having pain and numbness in the knee. Will try PT, MDP and Neurontin for nerve pain. Has extensive spine hx possible instability above fusion but does have some pes pain and swelling in the knee- No obvious instability in the knee. she is on chronic narcotics. FU in 4 month

## 2022-05-23 NOTE — HISTORY OF PRESENT ILLNESS
[10] : 10 [7] : 7 [Sharp] : sharp [Frequent] : frequent [de-identified] : 5/23 8 months s/p rt TKA - ant knee pain -  numbness lateral knee- pain mostly walking - and never got sig better than before -  no mechanical symptoms --  pain with lower back as well so hard to determine if from knee and Back -  had spinal stenosis eileen in 2019 - \par \par PRev Doc:\par  12/13/21: 6 weeks s/p R TKA. Continuing with Oxy as hydrocodone has not provided relief. \par Pain in the right knee for multiple years that has been getting worse over the last few months. She has tried PT and\par injections with no relief. Pain radiates down the leg. Pain is affecting her ADL's. She takes Hydrocode but has some back\par pain from a previous spinal surgery.\par 11/8/21: 11 days s/p R TKA. Pain is continued but overall reports feeling improved and continuing home PT with\par improvement. Requesting new hydrocodone rx. Notes sleeping well with Hydrocodone. Denies fevers, chills, or other\par complaints [] : no [de-identified] : physical therapy

## 2022-06-27 ENCOUNTER — APPOINTMENT (OUTPATIENT)
Dept: ORTHOPEDIC SURGERY | Facility: CLINIC | Age: 82
End: 2022-06-27

## 2022-08-03 ENCOUNTER — APPOINTMENT (OUTPATIENT)
Dept: INTERNAL MEDICINE | Facility: CLINIC | Age: 82
End: 2022-08-03

## 2022-08-08 ENCOUNTER — APPOINTMENT (OUTPATIENT)
Dept: INTERNAL MEDICINE | Facility: CLINIC | Age: 82
End: 2022-08-08

## 2023-05-04 NOTE — DISCHARGE NOTE NURSING/CASE MANAGEMENT/SOCIAL WORK - NSTRANSFEREYEGLASSESPAIRS_GEN_A_NUR
1 pair Prescription Used: 1 Additional Change To Daily Dosage Administered Mid Treatment?: No Daily Dosage (Cgy): [address] Ultrasound Used Text: 1.5mm depth Show Ultrasound In Note?: Yes Additional Comments (Add Customization Of Note Here): Site: Right Dorsal Hand Ultrasound Not Used Text: Ultrasound was not performed today due to Treatment Documentation: High frequency ultrasound was performed in preparation for radiation therapy to determine depth, breadth, and\\ntotal volume of the target lesion. Volumetric data acquired was used to guide treatment device placement,\\nselection of cone size and margin for target volumes. US guidance has been established as an essential\\ncomponent to delivery of optimized radiation therapy for skin cancer in articles recently published by Sean Dunlapl al 1.\\nDaily guidance is evaluated and reviewed by the treating physician to guide ongoing treatment delivery, and\\nimages acquired for this purpose are stored on our [employer] for documentation of procedure performance. 1.\\NICANOR Candelario., SARITHA Foster, Preston S. et al. High resolution dermal ultrasound (US) combined with superficial radiation\\ntherapy (SRT) versus non-image guided SRT or external beam radiotherapy (XRT) in early-stage epithelial\\ncancer: a comparison of studies.  [facility] Cancer 23, 98 (2023) Calculate Total Cumulative Dose Automatically Or Manually: Automatically Treatment Documentation: High frequency ultrasound was performed in preparation for radiation therapy to determine depth, breadth, and\\ntotal volume of the target lesion. Volumetric data acquired was used to guide treatment device placement,\\nselection of cone size and margin for target volumes. US guidance has been established as an essential\\ncomponent to delivery of optimized radiation therapy for skin cancer in articles recently published by Harriet Javed al 1.\\nDaily guidance is evaluated and reviewed by the treating physician to guide ongoing treatment delivery, and\\nimages acquired for this purpose are stored on our [employer] for documentation of procedure performance. 1.\\NICANOR Candelario., SARITHA Foster, Preston S. et al. High resolution dermal ultrasound (US) combined with superficial radiation\\ntherapy (SRT) versus non-image guided SRT or external beam radiotherapy (XRT) in early-stage epithelial\\ncancer: a comparison of studies.  [facility] Cancer 23, 98 (2023) Additional Comments (Add Customization Of Note Here): Site: Right Aury Burnham Daily Dosage (Cgy): 350

## 2023-05-22 NOTE — H&P PST ADULT - NSICDXFAMILYHX_GEN_ALL_CORE_FT
FAMILY HISTORY:  Mother  Still living? Unknown  FH: type 2 diabetes, Age at diagnosis: Age Unknown [place] Counseling:  I discussed with the patient the risks of imiquimod including but not limited to erythema, scaling, itching, weeping, crusting, and pain.  Patient understands that the inflammatory response to imiquimod is variable from person to person and was educated regarded proper titration schedule.  If flu-like symptoms develop, patient knows to discontinue the medication and contact us.

## 2025-04-24 NOTE — OCCUPATIONAL THERAPY INITIAL EVALUATION ADULT - GROOMING, PREVIOUS LEVEL OF FUNCTION, OT EVAL
independent Vital Signs Last 24 Hrs  T(C): 36.8 (24 Apr 2025 08:05), Max: 37 (23 Apr 2025 20:49)  T(F): 98.2 (24 Apr 2025 08:05), Max: 98.6 (23 Apr 2025 20:49)  HR: 76 (24 Apr 2025 08:05) (73 - 76)  BP: 152/85 (24 Apr 2025 08:05) (132/73 - 152/85)  BP(mean): --  RR: 14 (24 Apr 2025 08:05) (14 - 14)  SpO2: 95% (24 Apr 2025 08:05) (95% - 95%)    Parameters below as of 24 Apr 2025 08:05  Patient On (Oxygen Delivery Method): room air

## 2025-05-12 NOTE — DISCHARGE NOTE PROVIDER - NSDCCPGOAL_GEN_ALL_CORE_FT
05/12/25                            Talat Nava  [address] [facility] [id number]    To Whom It May Concern:    This is to certify Talat Nava was evaluated with Sabrina Mata PA-C on 05/12/25.   No left handed work until follow up with hand surgery.              Electronically signed by:  Sabrina Mata PA-C  [facility]  [address] [facility] [address]  Dept Phone: [phone]        To get better and follow your care plan as instructed.